# Patient Record
Sex: FEMALE | Race: WHITE | HISPANIC OR LATINO | Employment: UNEMPLOYED | URBAN - METROPOLITAN AREA
[De-identification: names, ages, dates, MRNs, and addresses within clinical notes are randomized per-mention and may not be internally consistent; named-entity substitution may affect disease eponyms.]

---

## 2017-01-30 ENCOUNTER — OUTSIDE FACILITY SERVICE (OUTPATIENT)
Dept: SURGERY | Facility: CLINIC | Age: 52
End: 2017-01-30

## 2017-01-30 PROCEDURE — 45378 DIAGNOSTIC COLONOSCOPY: CPT | Performed by: SURGERY

## 2017-02-06 ENCOUNTER — LAB (OUTPATIENT)
Dept: LAB | Facility: HOSPITAL | Age: 52
End: 2017-02-06

## 2017-02-06 ENCOUNTER — OFFICE VISIT (OUTPATIENT)
Dept: ONCOLOGY | Facility: CLINIC | Age: 52
End: 2017-02-06

## 2017-02-06 VITALS
HEIGHT: 61 IN | DIASTOLIC BLOOD PRESSURE: 82 MMHG | HEART RATE: 93 BPM | WEIGHT: 148.3 LBS | SYSTOLIC BLOOD PRESSURE: 120 MMHG | BODY MASS INDEX: 28 KG/M2 | RESPIRATION RATE: 16 BRPM | TEMPERATURE: 98.1 F | OXYGEN SATURATION: 97 %

## 2017-02-06 DIAGNOSIS — D50.9 MICROCYTIC ANEMIA: ICD-10-CM

## 2017-02-06 DIAGNOSIS — D50.0 IRON DEFICIENCY ANEMIA DUE TO CHRONIC BLOOD LOSS: Primary | ICD-10-CM

## 2017-02-06 DIAGNOSIS — Z86.000 HISTORY OF DUCTAL CARCINOMA IN SITU (DCIS) OF BREAST: ICD-10-CM

## 2017-02-06 LAB
ALBUMIN SERPL-MCNC: 4.3 G/DL (ref 3.5–5.2)
ALBUMIN/GLOB SERPL: 1.7 G/DL (ref 1.1–2.4)
ALP SERPL-CCNC: 76 U/L (ref 38–116)
ALT SERPL W P-5'-P-CCNC: 7 U/L (ref 0–33)
ANION GAP SERPL CALCULATED.3IONS-SCNC: 10.9 MMOL/L
AST SERPL-CCNC: 11 U/L (ref 0–32)
BASOPHILS # BLD AUTO: 0.03 10*3/MM3 (ref 0–0.1)
BASOPHILS NFR BLD AUTO: 0.4 % (ref 0–1.1)
BILIRUB SERPL-MCNC: <0.2 MG/DL (ref 0.1–1.2)
BUN BLD-MCNC: 10 MG/DL (ref 6–20)
BUN/CREAT SERPL: 14.9 (ref 7.3–30)
CALCIUM SPEC-SCNC: 9.2 MG/DL (ref 8.5–10.2)
CHLORIDE SERPL-SCNC: 103 MMOL/L (ref 98–107)
CO2 SERPL-SCNC: 26.1 MMOL/L (ref 22–29)
CREAT BLD-MCNC: 0.67 MG/DL (ref 0.6–1.1)
DEPRECATED RDW RBC AUTO: 47.7 FL (ref 37–49)
EOSINOPHIL # BLD AUTO: 0.04 10*3/MM3 (ref 0–0.36)
EOSINOPHIL NFR BLD AUTO: 0.6 % (ref 1–5)
ERYTHROCYTE [DISTWIDTH] IN BLOOD BY AUTOMATED COUNT: 15.8 % (ref 11.7–14.5)
FERRITIN SERPL-MCNC: 10.4 NG/ML (ref 11–207)
GFR SERPL CREATININE-BSD FRML MDRD: 93 ML/MIN/1.73
GLOBULIN UR ELPH-MCNC: 2.6 GM/DL (ref 1.8–3.5)
GLUCOSE BLD-MCNC: 103 MG/DL (ref 74–124)
HCT VFR BLD AUTO: 41.4 % (ref 34–45)
HGB BLD-MCNC: 13.2 G/DL (ref 11.5–14.9)
HGB RETIC QN: 30.6 PG (ref 29.8–36.1)
IMM GRANULOCYTES # BLD: 0.02 10*3/MM3 (ref 0–0.03)
IMM GRANULOCYTES NFR BLD: 0.3 % (ref 0–0.5)
IMM RETICS NFR: 4.1 % (ref 3–15.8)
IRON 24H UR-MRATE: 41 MCG/DL (ref 37–145)
IRON SATN MFR SERPL: 10 % (ref 14–48)
LYMPHOCYTES # BLD AUTO: 2.05 10*3/MM3 (ref 1–3.5)
LYMPHOCYTES NFR BLD AUTO: 30.4 % (ref 20–49)
MCH RBC QN AUTO: 26.3 PG (ref 27–33)
MCHC RBC AUTO-ENTMCNC: 31.9 G/DL (ref 32–35)
MCV RBC AUTO: 82.6 FL (ref 83–97)
MONOCYTES # BLD AUTO: 0.41 10*3/MM3 (ref 0.25–0.8)
MONOCYTES NFR BLD AUTO: 6.1 % (ref 4–12)
NEUTROPHILS # BLD AUTO: 4.2 10*3/MM3 (ref 1.5–7)
NEUTROPHILS NFR BLD AUTO: 62.2 % (ref 39–75)
NRBC BLD MANUAL-RTO: 0 /100 WBC (ref 0–0)
PLATELET # BLD AUTO: 305 10*3/MM3 (ref 150–375)
PMV BLD AUTO: 9.2 FL (ref 8.9–12.1)
POTASSIUM BLD-SCNC: 4.6 MMOL/L (ref 3.5–4.7)
PROT SERPL-MCNC: 6.9 G/DL (ref 6.3–8)
RBC # BLD AUTO: 5.01 10*6/MM3 (ref 3.9–5)
RETICS/RBC NFR AUTO: 0.85 % (ref 0.6–2)
SODIUM BLD-SCNC: 140 MMOL/L (ref 134–145)
TIBC SERPL-MCNC: 396 MCG/DL (ref 249–505)
TRANSFERRIN SERPL-MCNC: 283 MG/DL (ref 200–360)
WBC NRBC COR # BLD: 6.75 10*3/MM3 (ref 4–10)

## 2017-02-06 PROCEDURE — 83540 ASSAY OF IRON: CPT

## 2017-02-06 PROCEDURE — 85025 COMPLETE CBC W/AUTO DIFF WBC: CPT

## 2017-02-06 PROCEDURE — 80053 COMPREHEN METABOLIC PANEL: CPT

## 2017-02-06 PROCEDURE — 85046 RETICYTE/HGB CONCENTRATE: CPT

## 2017-02-06 PROCEDURE — 82728 ASSAY OF FERRITIN: CPT

## 2017-02-06 PROCEDURE — 99213 OFFICE O/P EST LOW 20 MIN: CPT | Performed by: INTERNAL MEDICINE

## 2017-02-06 PROCEDURE — 36415 COLL VENOUS BLD VENIPUNCTURE: CPT

## 2017-02-06 PROCEDURE — 84466 ASSAY OF TRANSFERRIN: CPT

## 2017-02-06 RX ORDER — FERROUS SULFATE 325(65) MG
325 TABLET ORAL
Qty: 60 TABLET | Refills: 11
Start: 2017-02-06 | End: 2018-02-06

## 2017-02-06 NOTE — PROGRESS NOTES
[facility] OUTPATIENT CLINIC FOLLOW UP VISIT    REASON FOR FOLLOW-UP:    1.  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  2.  Right reduction mammoplasty for symmetry and revision of left transverse rectus abdominis myocutaneous flap with vertical lateral coning revision and revision of the medial aspect of the mastectomy scar by Dr. Narvaez on 12/29/2015.  3.  Abnormal mammogram on 8/30/2016 with a 2.4cm mass in the right breast at the 9:00 position 7cm from the nipple.  Biopsy on 9/12/2016 showed fibroadenoma.      4.  Iron deficiency anemia      HISTORY OF PRESENT ILLNESS:  eRnée Johns is a 51 y.o. female who returns today for follow up of the above issues.  I saw her initially on 11/9/2015 for hormone receptor negative DCIS of the left breast.  I recommended no medical therapy at that time with respect to the left breast.  We discussed that when she becomes postmenopausal we could consider vacation for prevention of breast abnormalities in the right breast.    On 12/29/2015 she had a right reduction mammoplasty and revision of the transverse rectus abdominis flap.  She also had abnormal breast imaging of the right breast and had a biopsy on 9/12/2016 showing fibroadenoma.    At her last visit on 11/14/2016 she was found to be iron deficient.  I recommended ferrous sulfate 325 mg twice daily.  She states that she generally tolerates this well although sometimes takes it only once daily due to stomach upset.    PAST MEDICAL, SURGICAL, FAMILY, AND SOCIAL HISTORIES WERE REVIEWED WITH THE PATIENT AND IN THE ELECTRONIC MEDICAL RECORD, AND WERE UPDATED IF INDICATED.    ALLERGIES:  No Known Allergies    MEDICATIONS:  The medication list has been reviewed with the patient by the medical assistant, and the list has been updated in the electronic medical record, which I  "reviewed.  Medication dosages and frequencies were confirmed to be accurate.    REVIEW OF SYSTEMS:  PAIN:  See Vital Signs below.  GENERAL:  No fevers, chills, night sweats, or unintended weight loss.  SKIN:  No rashes or non-healing lesions.  HEME/LYMPH:  No abnormal bleeding.  No palpable lymphadenopathy.  EYES:  No vision changes or diplopia.  ENT:  No sore throat or difficulty swallowing.  RESPIRATORY:  No cough, shortness of breath, hemoptysis, or wheezing.  CARDIOVASCULAR:  No chest pain, palpitations, orthopnea, or dyspnea on exertion.  GASTROINTESTINAL:  No abdominal pain, nausea, vomiting, constipation, diarrhea, melena, or hematochezia.  GENITOURINARY:  No dysuria or hematuria.  MUSCULOSKELETAL:  No joint pain, swelling, or erythema.  NEUROLOGIC:  No dizziness, loss of consciousness, or seizures.  PSYCHIATRIC:  No depression, anxiety, or mood changes.    Vitals:    02/06/17 0912   BP: 120/82   Pulse: 93   Resp: 16   Temp: 98.1 °F (36.7 °C)   TempSrc: Oral   SpO2: 97%   Weight: 148 lb 4.8 oz (67.3 kg)   Height: 60.63\" (154 cm)   PainSc: 0-No pain       PHYSICAL EXAMINATION:  GENERAL:  Well-developed well-nourished female; awake, alert and oriented, in no acute distress.  SKIN:  Warm and dry, without rashes, purpura, or petechiae.  HEAD:  Normocephalic, atraumatic.  EYES:  Pupils equal, round and reactive to light.  Extraocular movements intact.  Conjunctivae normal.  EARS:  Hearing intact.  NOSE:  Septum midline.  No excoriations or nasal discharge.  MOUTH:  No stomatitis or ulcers.  Lips are normal.  THROAT:  Oropharynx without lesions or exudates.  NECK:  Supple with good range of motion; no thyromegaly or masses; no JVD or bruits.  LYMPHATICS:  No cervical, supraclavicular, axillary, or inguinal lymphadenopathy.  CHEST:  Lungs are clear to auscultation bilaterally.  No wheezes, rales, or rhonchi.  BREASTS: Not examined today  HEART:  Regular rate; normal rhythm.  No murmurs, gallops or rubs.  ABDOMEN:  " Soft, non-tender, non-distended.  Normal active bowel sounds.  No organomegaly.  TRAM flap incision well-healed.  EXTREMITIES:  No clubbing, cyanosis, or edema.  NEUROLOGICAL:  No focal neurologic deficits.    DIAGNOSTIC DATA:  Lab Results   Component Value Date    WBC 6.75 02/06/2017    HGB 13.2 02/06/2017    HCT 41.4 02/06/2017    MCV 82.6 (L) 02/06/2017     02/06/2017    ferritin 10.4  10% iron saturation    IMAGING:  Mammogram 8/30/2016  IMPRESSION:  1. Irregular 2.4 cm mass in the right breast at the 9:00 position on the  order of 7 cm from the nipple. Correlation with an ultrasound guided  right breast biopsy is recommended.       2. Benign cyst in the right breast as described. If desired, the  dominant cyst measuring on the order of 2.5 cm in greatest dimension at  the 10:00 position on the order of 3 cm from the nipple can be aspirated  for symptomatic relief to resolve its presence.      3. There are no findings suspicious for malignancy in the left TRAM  flap.      ASSESSMENT:  This is a 51 y.o. female with:  1.  History of hormone receptor negative DCIS of the left breast status post mastectomy with TRAM flap.  No medical therapy has been recommended.  We may consider chemoprevention when she becomes postmenopausal.  2.  Recent abnormal mammogram of the right breast with biopsy showing fibroadenoma  3.  Iron deficiency anemia most likely secondary to menorrhagia.  She continues ferrous sulfate twice daily with a significant improvement in her hemoglobin.  Iron levels remain a little bit low.    PLAN:   1.  Continue ferrous sulfate 325 mg twice daily.  2.  Return in 4 months for follow-up with a CBC and stat iron labs.

## 2017-05-26 ENCOUNTER — OFFICE VISIT (OUTPATIENT)
Dept: ONCOLOGY | Facility: CLINIC | Age: 52
End: 2017-05-26

## 2017-05-26 ENCOUNTER — LAB (OUTPATIENT)
Dept: LAB | Facility: HOSPITAL | Age: 52
End: 2017-05-26

## 2017-05-26 VITALS
HEART RATE: 70 BPM | RESPIRATION RATE: 16 BRPM | WEIGHT: 144.2 LBS | HEIGHT: 61 IN | OXYGEN SATURATION: 97 % | DIASTOLIC BLOOD PRESSURE: 64 MMHG | BODY MASS INDEX: 27.23 KG/M2 | TEMPERATURE: 98.4 F | SYSTOLIC BLOOD PRESSURE: 118 MMHG

## 2017-05-26 DIAGNOSIS — D50.0 IRON DEFICIENCY ANEMIA DUE TO CHRONIC BLOOD LOSS: Primary | ICD-10-CM

## 2017-05-26 DIAGNOSIS — Z86.000 HISTORY OF DUCTAL CARCINOMA IN SITU (DCIS) OF BREAST: ICD-10-CM

## 2017-05-26 DIAGNOSIS — D50.0 IRON DEFICIENCY ANEMIA DUE TO CHRONIC BLOOD LOSS: ICD-10-CM

## 2017-05-26 LAB
BASOPHILS # BLD AUTO: 0.03 10*3/MM3 (ref 0–0.1)
BASOPHILS NFR BLD AUTO: 0.5 % (ref 0–1.1)
DEPRECATED RDW RBC AUTO: 39.7 FL (ref 37–49)
EOSINOPHIL # BLD AUTO: 0.04 10*3/MM3 (ref 0–0.36)
EOSINOPHIL NFR BLD AUTO: 0.7 % (ref 1–5)
ERYTHROCYTE [DISTWIDTH] IN BLOOD BY AUTOMATED COUNT: 12.7 % (ref 11.7–14.5)
FERRITIN SERPL-MCNC: 19.3 NG/ML (ref 11–207)
HCT VFR BLD AUTO: 42.8 % (ref 34–45)
HGB BLD-MCNC: 13.7 G/DL (ref 11.5–14.9)
HGB RETIC QN: 31.5 PG (ref 29.8–36.1)
HOLD SPECIMEN: NORMAL
IMM GRANULOCYTES # BLD: 0.01 10*3/MM3 (ref 0–0.03)
IMM GRANULOCYTES NFR BLD: 0.2 % (ref 0–0.5)
IMM RETICS NFR: 2 % (ref 3–15.8)
IRON 24H UR-MRATE: 109 MCG/DL (ref 37–145)
IRON SATN MFR SERPL: 26 % (ref 14–48)
LYMPHOCYTES # BLD AUTO: 1.37 10*3/MM3 (ref 1–3.5)
LYMPHOCYTES NFR BLD AUTO: 22.9 % (ref 20–49)
MCH RBC QN AUTO: 27.5 PG (ref 27–33)
MCHC RBC AUTO-ENTMCNC: 32 G/DL (ref 32–35)
MCV RBC AUTO: 85.9 FL (ref 83–97)
MONOCYTES # BLD AUTO: 0.39 10*3/MM3 (ref 0.25–0.8)
MONOCYTES NFR BLD AUTO: 6.5 % (ref 4–12)
NEUTROPHILS # BLD AUTO: 4.15 10*3/MM3 (ref 1.5–7)
NEUTROPHILS NFR BLD AUTO: 69.2 % (ref 39–75)
NRBC BLD MANUAL-RTO: 0 /100 WBC (ref 0–0)
PLATELET # BLD AUTO: 290 10*3/MM3 (ref 150–375)
PMV BLD AUTO: 9.7 FL (ref 8.9–12.1)
RBC # BLD AUTO: 4.98 10*6/MM3 (ref 3.9–5)
RETICS/RBC NFR AUTO: 0.76 % (ref 0.6–2)
TIBC SERPL-MCNC: 421 MCG/DL (ref 249–505)
TRANSFERRIN SERPL-MCNC: 301 MG/DL (ref 200–360)
WBC NRBC COR # BLD: 5.99 10*3/MM3 (ref 4–10)

## 2017-05-26 PROCEDURE — 84466 ASSAY OF TRANSFERRIN: CPT

## 2017-05-26 PROCEDURE — 36415 COLL VENOUS BLD VENIPUNCTURE: CPT

## 2017-05-26 PROCEDURE — 85046 RETICYTE/HGB CONCENTRATE: CPT

## 2017-05-26 PROCEDURE — 85025 COMPLETE CBC W/AUTO DIFF WBC: CPT

## 2017-05-26 PROCEDURE — 99213 OFFICE O/P EST LOW 20 MIN: CPT | Performed by: INTERNAL MEDICINE

## 2017-05-26 PROCEDURE — 83540 ASSAY OF IRON: CPT

## 2017-05-26 PROCEDURE — 82728 ASSAY OF FERRITIN: CPT

## 2017-08-31 ENCOUNTER — HOSPITAL ENCOUNTER (OUTPATIENT)
Dept: MAMMOGRAPHY | Facility: HOSPITAL | Age: 52
Discharge: HOME | End: 2017-08-31
Attending: INTERNAL MEDICINE | Admitting: INTERNAL MEDICINE

## 2017-08-31 DIAGNOSIS — Z86.000 HISTORY OF DUCTAL CARCINOMA IN SITU (DCIS) OF BREAST: ICD-10-CM

## 2017-08-31 PROCEDURE — G0202 SCR MAMMO BI INCL CAD: HCPCS

## 2017-10-19 ENCOUNTER — OFFICE VISIT (OUTPATIENT)
Dept: SURGERY | Facility: CLINIC | Age: 52
End: 2017-10-19

## 2017-10-19 VITALS
SYSTOLIC BLOOD PRESSURE: 120 MMHG | OXYGEN SATURATION: 99 % | WEIGHT: 145.4 LBS | DIASTOLIC BLOOD PRESSURE: 80 MMHG | HEIGHT: 60 IN | BODY MASS INDEX: 28.54 KG/M2 | HEART RATE: 82 BPM

## 2017-10-19 DIAGNOSIS — Z85.3 HISTORY OF BREAST CANCER IN FEMALE: Primary | ICD-10-CM

## 2017-10-19 PROCEDURE — 99213 OFFICE O/P EST LOW 20 MIN: CPT | Performed by: SURGERY

## 2017-10-19 NOTE — PROGRESS NOTES
"Chief complaint: Personal history of left breast cancer yearly follow-up     Patient is a 51 y.o. female established history of left breast cancer with mastectomy and TRAM flap.  Patient denies any new symptoms.  Patient reports she has not felt any new masses in the right breast or nodules in the skin flaps of the left TRAM flap.    Past Medical History:   Diagnosis Date   • Breast cancer     Left breast - 2015   • Cancer    • Endometrial hyperplasia without atypia, simple     tx w/ provera, recheck EMB 6 mo wnl     Past Surgical History:   Procedure Laterality Date   • BREAST SURGERY      Done by     •  SECTION     • DILATATION AND CURETTAGE     • MASTECTOMY Left 10/13/2015    Mastectomy with TRAM flap   • TUBAL ABDOMINAL LIGATION       Family History   Problem Relation Age of Onset   • Family history unknown: Yes   • Diabetes Father      Social History   Substance Use Topics   • Smoking status: Never Smoker   • Smokeless tobacco: Never Used   • Alcohol use No         Current Outpatient Prescriptions:   •  ferrous sulfate 325 (65 FE) MG tablet, Take 1 tablet by mouth 2 (Two) Times a Day Before Meals., Disp: 60 tablet, Rfl: 11    Review of Systems  All other systems have been reviewed and are negative.  Vitals:    10/19/17 0846   BP: 120/80   Pulse: 82   SpO2: 99%   Weight: 145 lb 6.4 oz (66 kg)   Height: 60\" (152.4 cm)       Physical Exam  General/physcological:   Alert and oriented x3 in no acute distress  HEENT: Normal cephalic, atraumatic, PERRLA, EOMI, sclera anicteric, moist mucous membranes, neck is supple, no JVD, no carotid bruits, no thyromegaly no adenopathy  Respiratory: CTA and percussion  CVA: RRR, normal S1-S2, no murmurs, no gallops or rubs  Breast: Breast examination is performed in sitting position as well as supine position.  In the sitting position the right breast has no nipple retraction or skin changes.  Upon palpation there are no discrete palpable masses in the " right breast or the left TRAM flap.  There is no palpable bilateral axillary or supraclavicular adenopathy.  Breast examination is repeated in the supine position and there are no new findings.  GI: Positive BS, soft, nondistended, nontender, no rebound, no guarding, no hernias, no organomegaly and no masses  Musculoskeletal: Full range of motion, no clubbing, no cyanosis or edema  Neurovascular: Grossly intact    Patient does not use tobacco products currently and I have counseled the patient to not start using tobacco products in the future.  Mammogram has been reviewed and is benign  Assessment:  History of left breast cancer with left mastectomy and TRAM flap  Plan:  I have discussed the above findings and x-ray findings with the patient.  I will see her back in one year for her yearly exam with a repeat mammogram.  I have instructed the patient to continue self breast examinations monthly and follow-up if she finds any new findings.    Natividad Zapata MD  General, Minimally Invasive and Endoscopic Surgery  [facility]    [address], [address]  [place], [place], [facility]  P: [phone]  F: [phone]    Cc:  Natividad Zapata MD

## 2017-10-19 NOTE — PROGRESS NOTES
"Chief complaint:  Post-op  Follow up    History of Present Illness    This is Renée Johns 51 y.o. status post *** and is doing very well.  Patient denies fever, chills, nausea or vomiting.  Patient's pain is well-controlled.      The following portions of the patient's history were reviewed and updated as appropriate: allergies, current medications, past family history, past medical history, past social history, past surgical history and problem list.    Vitals:    10/19/17 0846   BP: 120/80   Pulse: 82   SpO2: 99%   Weight: 145 lb 6.4 oz (66 kg)   Height: 60\" (152.4 cm)       Physical Exam  Incision is well-healed without evidence of {No wound infection:57504}.    Patient does not use tobacco products currently and I have counseled the patient not to start using tobacco products in the future.    Assessment/plan:    This is Renée Johns 51 y.o. status post *** and is doing very well.  I have instructed the patient not lift greater than 10 pounds for total of 6 weeks from the time of surgery. I have instructed the patient follow-up as needed.    Natividad Zapata MD  General, Minimally Invasive and Endoscopic Surgery  [facility]    [address], [address]  [place], [place], [address]  P: [phone]  F: [phone]    Cc:  Natividad Zapata MD  "

## 2017-11-03 ENCOUNTER — OFFICE VISIT (OUTPATIENT)
Dept: ONCOLOGY | Facility: CLINIC | Age: 52
End: 2017-11-03

## 2017-11-03 ENCOUNTER — LAB (OUTPATIENT)
Dept: LAB | Facility: HOSPITAL | Age: 52
End: 2017-11-03

## 2017-11-03 VITALS
WEIGHT: 147.2 LBS | OXYGEN SATURATION: 99 % | SYSTOLIC BLOOD PRESSURE: 114 MMHG | BODY MASS INDEX: 27.79 KG/M2 | DIASTOLIC BLOOD PRESSURE: 68 MMHG | HEIGHT: 61 IN | RESPIRATION RATE: 16 BRPM | HEART RATE: 87 BPM | TEMPERATURE: 98.3 F

## 2017-11-03 DIAGNOSIS — D50.0 IRON DEFICIENCY ANEMIA DUE TO CHRONIC BLOOD LOSS: ICD-10-CM

## 2017-11-03 DIAGNOSIS — Z86.000 HISTORY OF DUCTAL CARCINOMA IN SITU (DCIS) OF BREAST: Primary | ICD-10-CM

## 2017-11-03 LAB
BASOPHILS # BLD AUTO: 0.03 10*3/MM3 (ref 0–0.1)
BASOPHILS NFR BLD AUTO: 0.4 % (ref 0–1.1)
DEPRECATED RDW RBC AUTO: 39.5 FL (ref 37–49)
EOSINOPHIL # BLD AUTO: 0.03 10*3/MM3 (ref 0–0.36)
EOSINOPHIL NFR BLD AUTO: 0.4 % (ref 1–5)
ERYTHROCYTE [DISTWIDTH] IN BLOOD BY AUTOMATED COUNT: 13.1 % (ref 11.7–14.5)
HCT VFR BLD AUTO: 39.9 % (ref 34–45)
HGB BLD-MCNC: 13 G/DL (ref 11.5–14.9)
HGB RETIC QN: 31 PG (ref 29.8–36.1)
IMM GRANULOCYTES # BLD: 0.02 10*3/MM3 (ref 0–0.03)
IMM GRANULOCYTES NFR BLD: 0.3 % (ref 0–0.5)
IMM RETICS NFR: 6.7 % (ref 3–15.8)
LYMPHOCYTES # BLD AUTO: 1.58 10*3/MM3 (ref 1–3.5)
LYMPHOCYTES NFR BLD AUTO: 23.2 % (ref 20–49)
MCH RBC QN AUTO: 26.9 PG (ref 27–33)
MCHC RBC AUTO-ENTMCNC: 32.6 G/DL (ref 32–35)
MCV RBC AUTO: 82.4 FL (ref 83–97)
MONOCYTES # BLD AUTO: 0.43 10*3/MM3 (ref 0.25–0.8)
MONOCYTES NFR BLD AUTO: 6.3 % (ref 4–12)
NEUTROPHILS # BLD AUTO: 4.73 10*3/MM3 (ref 1.5–7)
NEUTROPHILS NFR BLD AUTO: 69.4 % (ref 39–75)
NRBC BLD MANUAL-RTO: 0 /100 WBC (ref 0–0)
PLATELET # BLD AUTO: 275 10*3/MM3 (ref 150–375)
PMV BLD AUTO: 9.9 FL (ref 8.9–12.1)
RBC # BLD AUTO: 4.84 10*6/MM3 (ref 3.9–5)
RETICS/RBC NFR AUTO: 1 % (ref 0.6–2)
WBC NRBC COR # BLD: 6.82 10*3/MM3 (ref 4–10)

## 2017-11-03 PROCEDURE — 85025 COMPLETE CBC W/AUTO DIFF WBC: CPT | Performed by: INTERNAL MEDICINE

## 2017-11-03 PROCEDURE — 85046 RETICYTE/HGB CONCENTRATE: CPT | Performed by: INTERNAL MEDICINE

## 2017-11-03 PROCEDURE — 99213 OFFICE O/P EST LOW 20 MIN: CPT | Performed by: INTERNAL MEDICINE

## 2017-11-03 PROCEDURE — 36416 COLLJ CAPILLARY BLOOD SPEC: CPT | Performed by: INTERNAL MEDICINE

## 2017-11-03 NOTE — PROGRESS NOTES
[facility] OUTPATIENT CLINIC FOLLOW UP VISIT    REASON FOR FOLLOW-UP:    1.  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  2.  Right reduction mammoplasty for symmetry and revision of left transverse rectus abdominis myocutaneous flap with vertical lateral coning revision and revision of the medial aspect of the mastectomy scar by Dr. Narvaez on 12/29/2015.  3.  Abnormal mammogram on 8/30/2016 with a 2.4cm mass in the right breast at the 9:00 position 7cm from the nipple.  Biopsy on 9/12/2016 showed fibroadenoma.      4.  Iron deficiency anemia      HISTORY OF PRESENT ILLNESS:  Renée Johns is a 51 y.o. female who returns today for follow up of the above issues.  I saw her initially on 11/9/2015 for hormone receptor negative DCIS of the left breast.  I recommended no medical therapy at that time with respect to the left breast.  We discussed that when she becomes postmenopausal we could consider medication for prevention of breast abnormalities in the right breast.    On 12/29/2015 she had a right reduction mammoplasty and revision of the transverse rectus abdominis flap.  She also had abnormal breast imaging of the right breast and had a biopsy on 9/12/2016 showing fibroadenoma.    On 11/14/2016 she was found to be iron deficient.  I recommended ferrous sulfate 325 mg twice daily.  At this point she has continued ferrous sulfate 1 tablet every other day.  She tolerates this very well.    No new problems with her breasts.    PAST MEDICAL, SURGICAL, FAMILY, AND SOCIAL HISTORIES WERE REVIEWED WITH THE PATIENT AND IN THE ELECTRONIC MEDICAL RECORD, AND WERE UPDATED IF INDICATED.    ALLERGIES:  No Known Allergies    MEDICATIONS:  The medication list has been reviewed with the patient by the medical assistant, and the list has been updated in the electronic medical  "record, which I reviewed.  Medication dosages and frequencies were confirmed to be accurate.    REVIEW OF SYSTEMS:  PAIN:  See Vital Signs below.  GENERAL:  No fevers, chills, night sweats, or unintended weight loss.  SKIN:  No rashes or non-healing lesions.  HEME/LYMPH:  No abnormal bleeding.  No palpable lymphadenopathy.  EYES:  No vision changes or diplopia.  ENT:  No sore throat or difficulty swallowing.  RESPIRATORY:  No cough, shortness of breath, hemoptysis, or wheezing.  CARDIOVASCULAR:  No chest pain, palpitations, orthopnea, or dyspnea on exertion.  GASTROINTESTINAL:  No abdominal pain, nausea, vomiting, constipation, diarrhea, melena, or hematochezia.  See history of present illness  GENITOURINARY:  No dysuria or hematuria.  MUSCULOSKELETAL:  No joint pain, swelling, or erythema.  NEUROLOGIC:  No dizziness, loss of consciousness, or seizures.  PSYCHIATRIC:  No depression, anxiety, or mood changes.    Vitals:    11/03/17 1111   BP: 114/68   Pulse: 87   Resp: 16   Temp: 98.3 °F (36.8 °C)   TempSrc: Oral   SpO2: 99%   Weight: 147 lb 3.2 oz (66.8 kg)   Height: 60.55\" (153.8 cm)  Comment: new ht without shoes   PainSc: 0-No pain       PHYSICAL EXAMINATION:  GENERAL:  Well-developed well-nourished female; awake, alert and oriented, in no acute distress.  SKIN:  Warm and dry, without rashes, purpura, or petechiae.  HEAD:  Normocephalic, atraumatic.  EYES:  Pupils equal, round and reactive to light.  Extraocular movements intact.  Conjunctivae normal.  EARS:  Hearing intact.  NOSE:  Septum midline.  No excoriations or nasal discharge.  MOUTH:  No stomatitis or ulcers.  Lips are normal.  THROAT:  Oropharynx without lesions or exudates.  NECK:  Supple with good range of motion; no thyromegaly or masses; no JVD or bruits.  LYMPHATICS:  No cervical, supraclavicular, axillary, or inguinal lymphadenopathy.  CHEST:  Lungs are clear to auscultation bilaterally.  No wheezes, rales, or rhonchi.  BREASTS:Both breasts were " examined today.  Left TRAM flap without abnormalities.  Prior right breast reduction.  Some fullness at the 9 to 10 o'clock position adjacent to the nipple in the right breast but no discrete nodule.  HEART:  Regular rate; normal rhythm.  No murmurs, gallops or rubs.  ABDOMEN:  Soft, non-tender, non-distended.  Normal active bowel sounds.  No organomegaly.  TRAM flap incision well-healed.  EXTREMITIES:  No clubbing, cyanosis, or edema.  NEUROLOGICAL:  No focal neurologic deficits.    DIAGNOSTIC DATA:  Lab Results   Component Value Date    WBC 6.82 11/03/2017    HGB 13.0 11/03/2017    HCT 39.9 11/03/2017    MCV 82.4 (L) 11/03/2017     11/03/2017       IMAGING:  Mammogram 8/31/2017    FINDINGS: Right breast scattered fibroglandular densities. There is now  a surgical marker in position. There is a stable circumscribed rounded  opacity within the superior aspect of the breast again demonstrated 2.9  cm in diameter. This corresponds to the cyst demonstrated on 08/30/2016  ultrasound located at the 10 o'clock position. No suspicious mass. No  new mass. No axillary lymphadenopathy nor suspicious calcification  identified.      The left TRAM flap is stable and satisfactory in appearance.  False-negative rate at least 10%.      CONCLUSION: Benign BI-RADS Category 2, recommend monthly  self-examination and annual mammography.  ASSESSMENT:  This is a 51 y.o. female with:  1.  History of hormone receptor negative DCIS of the left breast status post mastectomy with TRAM flap.  No medical therapy has been recommended.  We may consider chemoprevention when she becomes postmenopausal.  2.  Abnormal mammogram of the right breast with biopsy showing fibroadenoma  3.  Iron deficiency anemia most likely secondary to menorrhagia.  She continues ferrous sulfate 1 tablet every other day.  I recommended that she continue on this schedule at this point.    PLAN:   1.  Continue ferrous sulfate 325 mg every other day or even 3 times  weekly.  2.  I will see her back in 6 months for follow-up with labs dating a CBC, reticulocyte count, ferritin, and iron panel.

## 2018-04-20 ENCOUNTER — OFFICE VISIT (OUTPATIENT)
Dept: ONCOLOGY | Facility: CLINIC | Age: 53
End: 2018-04-20

## 2018-04-20 ENCOUNTER — LAB (OUTPATIENT)
Dept: LAB | Facility: HOSPITAL | Age: 53
End: 2018-04-20

## 2018-04-20 VITALS
DIASTOLIC BLOOD PRESSURE: 82 MMHG | SYSTOLIC BLOOD PRESSURE: 118 MMHG | RESPIRATION RATE: 12 BRPM | BODY MASS INDEX: 28.43 KG/M2 | WEIGHT: 150.6 LBS | HEART RATE: 80 BPM | OXYGEN SATURATION: 99 % | HEIGHT: 61 IN | TEMPERATURE: 99.3 F

## 2018-04-20 DIAGNOSIS — D50.0 IRON DEFICIENCY ANEMIA DUE TO CHRONIC BLOOD LOSS: ICD-10-CM

## 2018-04-20 DIAGNOSIS — Z86.000 HISTORY OF DUCTAL CARCINOMA IN SITU (DCIS) OF BREAST: Primary | ICD-10-CM

## 2018-04-20 LAB
BASOPHILS # BLD AUTO: 0.03 10*3/MM3 (ref 0–0.1)
BASOPHILS NFR BLD AUTO: 0.4 % (ref 0–1.1)
DEPRECATED RDW RBC AUTO: 44 FL (ref 37–49)
EOSINOPHIL # BLD AUTO: 0.04 10*3/MM3 (ref 0–0.36)
EOSINOPHIL NFR BLD AUTO: 0.5 % (ref 1–5)
ERYTHROCYTE [DISTWIDTH] IN BLOOD BY AUTOMATED COUNT: 14.5 % (ref 11.7–14.5)
FERRITIN SERPL-MCNC: 7.2 NG/ML (ref 11–207)
HCT VFR BLD AUTO: 40.7 % (ref 34–45)
HGB BLD-MCNC: 12.5 G/DL (ref 11.5–14.9)
HGB RETIC QN: 30.8 PG (ref 29.8–36.1)
IMM GRANULOCYTES # BLD: 0.02 10*3/MM3 (ref 0–0.03)
IMM GRANULOCYTES NFR BLD: 0.3 % (ref 0–0.5)
IMM RETICS NFR: 7.6 % (ref 3–15.8)
IRON 24H UR-MRATE: 63 MCG/DL (ref 37–145)
IRON SATN MFR SERPL: 15 % (ref 14–48)
LYMPHOCYTES # BLD AUTO: 1.46 10*3/MM3 (ref 1–3.5)
LYMPHOCYTES NFR BLD AUTO: 19.4 % (ref 20–49)
MCH RBC QN AUTO: 25.8 PG (ref 27–33)
MCHC RBC AUTO-ENTMCNC: 30.7 G/DL (ref 32–35)
MCV RBC AUTO: 84.1 FL (ref 83–97)
MONOCYTES # BLD AUTO: 0.54 10*3/MM3 (ref 0.25–0.8)
MONOCYTES NFR BLD AUTO: 7.2 % (ref 4–12)
NEUTROPHILS # BLD AUTO: 5.42 10*3/MM3 (ref 1.5–7)
NEUTROPHILS NFR BLD AUTO: 72.2 % (ref 39–75)
NRBC BLD MANUAL-RTO: 0 /100 WBC (ref 0–0)
PLATELET # BLD AUTO: 293 10*3/MM3 (ref 150–375)
PMV BLD AUTO: 9.7 FL (ref 8.9–12.1)
RBC # BLD AUTO: 4.84 10*6/MM3 (ref 3.9–5)
RETICS/RBC NFR AUTO: 1.06 % (ref 0.6–2)
TIBC SERPL-MCNC: 409 MCG/DL (ref 249–505)
TRANSFERRIN SERPL-MCNC: 292 MG/DL (ref 200–360)
WBC NRBC COR # BLD: 7.51 10*3/MM3 (ref 4–10)

## 2018-04-20 PROCEDURE — 36415 COLL VENOUS BLD VENIPUNCTURE: CPT | Performed by: INTERNAL MEDICINE

## 2018-04-20 PROCEDURE — 85025 COMPLETE CBC W/AUTO DIFF WBC: CPT | Performed by: INTERNAL MEDICINE

## 2018-04-20 PROCEDURE — 82728 ASSAY OF FERRITIN: CPT | Performed by: INTERNAL MEDICINE

## 2018-04-20 PROCEDURE — 84466 ASSAY OF TRANSFERRIN: CPT | Performed by: INTERNAL MEDICINE

## 2018-04-20 PROCEDURE — 85046 RETICYTE/HGB CONCENTRATE: CPT | Performed by: INTERNAL MEDICINE

## 2018-04-20 PROCEDURE — 83540 ASSAY OF IRON: CPT | Performed by: INTERNAL MEDICINE

## 2018-04-20 PROCEDURE — 99213 OFFICE O/P EST LOW 20 MIN: CPT | Performed by: INTERNAL MEDICINE

## 2018-04-20 NOTE — PROGRESS NOTES
[facility] OUTPATIENT CLINIC FOLLOW UP VISIT    REASON FOR FOLLOW-UP:    1.  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  2.  Right reduction mammoplasty for symmetry and revision of left transverse rectus abdominis myocutaneous flap with vertical lateral coning revision and revision of the medial aspect of the mastectomy scar by Dr. Narvaez on 12/29/2015.  3.  Abnormal mammogram on 8/30/2016 with a 2.4cm mass in the right breast at the 9:00 position 7cm from the nipple.  Biopsy on 9/12/2016 showed fibroadenoma.      4.  Iron deficiency anemia      HISTORY OF PRESENT ILLNESS:  Renée Johns is a 52 y.o. female who returns today for follow up of the above issues.  I saw her initially on 11/9/2015 for hormone receptor negative DCIS of the left breast.  I recommended no medical therapy at that time with respect to the left breast.  We discussed that when she becomes postmenopausal we could consider medication for prevention of breast abnormalities in the right breast.    On 12/29/2015 she had a right reduction mammoplasty and revision of the transverse rectus abdominis flap.  She also had abnormal breast imaging of the right breast and had a biopsy on 9/12/2016 showing fibroadenoma.    On 11/14/2016 she was found to be iron deficient.  I recommended ferrous sulfate 325 mg twice daily.     She is taking the ferrous sulfate sporadically at this point but otherwise tolerates it well.  She states that her menstrual blood loss has decreased over the past few months.  She denies any other bleeding.    No new problems with her breasts.  No new nodules or nipple discharge.    PAST MEDICAL, SURGICAL, FAMILY, AND SOCIAL HISTORIES WERE REVIEWED WITH THE PATIENT AND IN THE ELECTRONIC MEDICAL RECORD, AND WERE UPDATED IF INDICATED.    ALLERGIES:  No Known  "Allergies    MEDICATIONS:  The medication list has been reviewed with the patient by the medical assistant, and the list has been updated in the electronic medical record, which I reviewed.  Medication dosages and frequencies were confirmed to be accurate.    REVIEW OF SYSTEMS:  PAIN:  See Vital Signs below.  GENERAL:  No fevers, chills, night sweats, or unintended weight loss.  SKIN:  No rashes or non-healing lesions.  HEME/LYMPH:  No abnormal bleeding.  No palpable lymphadenopathy.  EYES:  No vision changes or diplopia.  ENT:  No sore throat or difficulty swallowing.  RESPIRATORY:  No cough, shortness of breath, hemoptysis, or wheezing.  CARDIOVASCULAR:  No chest pain, palpitations, orthopnea, or dyspnea on exertion.  GASTROINTESTINAL:  No abdominal pain, nausea, vomiting, constipation, diarrhea, melena, or hematochezia.   GENITOURINARY:  No dysuria or hematuria.  MUSCULOSKELETAL:  No joint pain, swelling, or erythema.  NEUROLOGIC:  No dizziness, loss of consciousness, or seizures.  PSYCHIATRIC:  No depression, anxiety, or mood changes.    Vitals:    04/20/18 0937   BP: 118/82   Pulse: 80   Resp: 12   Temp: 99.3 °F (37.4 °C)   TempSrc: Oral   SpO2: 99%   Weight: 68.3 kg (150 lb 9.6 oz)   Height: 154.4 cm (60.79\")  Comment: new height with out shoes on   PainSc: 0-No pain       PHYSICAL EXAMINATION:  GENERAL:  Well-developed well-nourished female; awake, alert and oriented, in no acute distress.  SKIN:  Warm and dry, without rashes, purpura, or petechiae.  HEAD:  Normocephalic, atraumatic.  EYES:  Pupils equal, round and reactive to light.  Extraocular movements intact.  Conjunctivae normal.  EARS:  Hearing intact.  NOSE:  Septum midline.  No excoriations or nasal discharge.  MOUTH:  No stomatitis or ulcers.  Lips are normal.  THROAT:  Oropharynx without lesions or exudates.  NECK:  Supple with good range of motion; no thyromegaly or masses; no JVD or bruits.  LYMPHATICS:  No cervical, supraclavicular, axillary, or " inguinal lymphadenopathy.  CHEST:  Lungs are clear to auscultation bilaterally.  No wheezes, rales, or rhonchi.  BREASTS:Both breasts were examined today.  Left TRAM flap without abnormalities.  No nodules.  No skin changes.  Prior right breast reduction.  No discrete nodules.  No nipple discharge.  HEART:  Regular rate; normal rhythm.  No murmurs, gallops or rubs.  ABDOMEN:  Soft, non-tender, non-distended.  Normal active bowel sounds.  No organomegaly.  TRAM flap incision well-healed.  EXTREMITIES:  No clubbing, cyanosis, or edema.  NEUROLOGICAL:  No focal neurologic deficits.    DIAGNOSTIC DATA:  Results for orders placed or performed in visit on 04/20/18   Retic With IRF & RET-He   Result Value Ref Range    Immature Reticulocyte Fraction 7.6 3.0 - 15.8 %    Reticulocyte % 1.06 0.60 - 2.00 %    Reticulocyte Hgb 30.8 29.8 - 36.1 pg   CBC Auto Differential   Result Value Ref Range    WBC 7.51 4.00 - 10.00 10*3/mm3    RBC 4.84 3.90 - 5.00 10*6/mm3    Hemoglobin 12.5 11.5 - 14.9 g/dL    Hematocrit 40.7 34.0 - 45.0 %    MCV 84.1 83.0 - 97.0 fL    MCH 25.8 (L) 27.0 - 33.0 pg    MCHC 30.7 (L) 32.0 - 35.0 g/dL    RDW 14.5 11.7 - 14.5 %    RDW-SD 44.0 37.0 - 49.0 fl    MPV 9.7 8.9 - 12.1 fL    Platelets 293 150 - 375 10*3/mm3    Neutrophil % 72.2 39.0 - 75.0 %    Lymphocyte % 19.4 (L) 20.0 - 49.0 %    Monocyte % 7.2 4.0 - 12.0 %    Eosinophil % 0.5 (L) 1.0 - 5.0 %    Basophil % 0.4 0.0 - 1.1 %    Immature Grans % 0.3 0.0 - 0.5 %    Neutrophils, Absolute 5.42 1.50 - 7.00 10*3/mm3    Lymphocytes, Absolute 1.46 1.00 - 3.50 10*3/mm3    Monocytes, Absolute 0.54 0.25 - 0.80 10*3/mm3    Eosinophils, Absolute 0.04 0.00 - 0.36 10*3/mm3    Basophils, Absolute 0.03 0.00 - 0.10 10*3/mm3    Immature Grans, Absolute 0.02 0.00 - 0.03 10*3/mm3    nRBC 0.0 0.0 - 0.0 /100 WBC       IMAGING:  Mammogram 8/31/2017    FINDINGS: Right breast scattered fibroglandular densities. There is now  a surgical marker in position. There is a stable  circumscribed rounded  opacity within the superior aspect of the breast again demonstrated 2.9  cm in diameter. This corresponds to the cyst demonstrated on 08/30/2016  ultrasound located at the 10 o'clock position. No suspicious mass. No  new mass. No axillary lymphadenopathy nor suspicious calcification  identified.      The left TRAM flap is stable and satisfactory in appearance.  False-negative rate at least 10%.      CONCLUSION: Benign BI-RADS Category 2, recommend monthly  self-examination and annual mammography.  ASSESSMENT:  This is a 52 y.o. female with:  1.  History of hormone receptor negative DCIS of the left breast status post mastectomy with TRAM flap.  No medical therapy has been recommended.  We may consider chemoprevention when she becomes postmenopausal.  2.  Abnormal mammogram of the right breast with biopsy showing fibroadenoma  3.  Iron deficiency anemia most likely secondary to menorrhagia.  She is on ferrous sulfate sporadically at this point.  Menstrual blood loss has lessened.  Follow-up iron studies from today.    PLAN:   1.  Continue ferrous sulfate 325 mg and we will follow-up her iron studies from today and notify her how frequently we think she needs to take this.  2.  I will see her back in 6 months for follow-up with labs dating a CBC, reticulocyte count, ferritin, and iron panel.  3.  Mammogram will be due in September.  Her last mammogram was on 8/31/2017.  This was ordered today.

## 2018-04-23 ENCOUNTER — TELEPHONE (OUTPATIENT)
Dept: ONCOLOGY | Facility: HOSPITAL | Age: 53
End: 2018-04-23

## 2018-04-23 NOTE — TELEPHONE ENCOUNTER
----- Message from Lalo Alvares MD sent at 4/22/2018  9:30 PM EDT -----  Please call the patient regarding her abnormal result.  Please let her know that her ferritin, a measure of iron storage in her body, has dropped, and she should try to take her iron supplement at least 4 days per week.  Thanks,  MARIELA      Spoke with patient, v/u.

## 2018-04-27 ENCOUNTER — OFFICE VISIT (OUTPATIENT)
Dept: OBSTETRICS AND GYNECOLOGY | Age: 53
End: 2018-04-27

## 2018-04-27 VITALS
DIASTOLIC BLOOD PRESSURE: 80 MMHG | BODY MASS INDEX: 29.76 KG/M2 | SYSTOLIC BLOOD PRESSURE: 122 MMHG | WEIGHT: 151.6 LBS | HEIGHT: 60 IN

## 2018-04-27 DIAGNOSIS — Z01.419 WELL WOMAN EXAM WITH ROUTINE GYNECOLOGICAL EXAM: Primary | ICD-10-CM

## 2018-04-27 PROCEDURE — 99396 PREV VISIT EST AGE 40-64: CPT | Performed by: OBSTETRICS & GYNECOLOGY

## 2018-04-27 NOTE — PROGRESS NOTES
Chief complaint: annual    Subjective   History of Present Illness    Renée Johns is a 52 y.o.  who presents for annual exam.  Her menses are regular every 28-30 days, lasting 3-8 days. Denies menorrhagia. H/o endometrial hyperplasia 2014 tx/ w/ Provera, until diagnosed w/ breast cancer. S/p mastectomy w/ reconstruction  10/2015 w/ Dr Zapata. DCIS. Doing well now. Getting yearly MMG. Has known uterine fibroids and declined tx. Last US  3.6 x 3 cm intramural fibroid.  Pap up to date    Obstetric History:  OB History      Para Term  AB Living    4 2 2  2 2    SAB TAB Ectopic Molar Multiple Live Births    2     2        Obstetric Comments    C/S x2, SAB w/ D&C x2         Menstrual History:     Patient's last menstrual period was 2018 (exact date).         Current contraception: tubal ligation  History of abnormal Pap smear: no  Received Gardasil immunization: no  Perform regular self breast exam: yes -    Family history of uterine or ovarian cancer: no  Family History of colon cancer: no  Family history of breast cancer: no    Mammogram: up to date.  Colonoscopy: up to date.  DEXA: not indicated.    Exercise: moderately active  Calcium/Vitamin D: adequate intake    The following portions of the patient's history were reviewed and updated as appropriate: allergies, current medications, past family history, past medical history, past social history, past surgical history and problem list.    Review of Systems   Constitutional: Negative for activity change, fatigue, fever and unexpected weight change.   Respiratory: Negative for chest tightness and shortness of breath.    Cardiovascular: Negative for chest pain, palpitations and leg swelling.   Gastrointestinal: Negative for abdominal distention, abdominal pain, blood in stool, constipation, diarrhea, nausea and vomiting.   Endocrine: Negative for cold intolerance, heat intolerance, polydipsia, polyphagia and polyuria.  "  Genitourinary: Negative.    Musculoskeletal: Negative for arthralgias and back pain.   Skin: Negative for color change.   Neurological: Negative for weakness and headaches.   Hematological: Does not bruise/bleed easily.   Psychiatric/Behavioral: Negative for confusion.       Pertinent items are noted in HPI.     Objective   Physical Exam    /80   Ht 152.4 cm (60\")   Wt 68.8 kg (151 lb 9.6 oz)   LMP 04/19/2018 (Exact Date)   BMI 29.61 kg/m²     General:   alert, appears stated age and cooperative   Neck: no asymmetry, masses, or scars   Heart: regular rate and rhythm, S1, S2 normal, no murmur, click, rub or gallop   Lungs: clear to auscultation bilaterally   Abdomen: soft, non-tender, without masses or organomegaly   Breast: left mastectomy and reconstruction scars, right reduction scars   Vulva: normal, Bartholin's, Urethra, Sturgeon's normal   Vagina: normal mucosa   Cervix: no lesions and nulliparous appearance   Uterus: anteverted, mobile, non-tender, normal shape and consistency   Adnexa: normal adnexa and no mass, fullness, tenderness   Rectal: not indicated     Assessment/Plan   Renée was seen today for gynecologic exam.    Diagnoses and all orders for this visit:    Well woman exam with routine gynecological exam        Breast self exam technique reviewed and patient encouraged to perform self-exam monthly.  Discussed healthy lifestyle modifications.  Pap smear up tod ate    Pap 10/2016 normal, HPV-, plan q 3yrs             "

## 2018-09-21 ENCOUNTER — HOSPITAL ENCOUNTER (OUTPATIENT)
Dept: MAMMOGRAPHY | Facility: HOSPITAL | Age: 53
Discharge: HOME | End: 2018-09-21
Attending: INTERNAL MEDICINE | Admitting: INTERNAL MEDICINE

## 2018-09-21 DIAGNOSIS — Z86.000 HISTORY OF DUCTAL CARCINOMA IN SITU (DCIS) OF BREAST: ICD-10-CM

## 2018-09-21 PROCEDURE — 77067 SCR MAMMO BI INCL CAD: CPT

## 2018-11-06 NOTE — PROGRESS NOTES
[facility] OUTPATIENT CLINIC FOLLOW UP VISIT    REASON FOR FOLLOW-UP:    1.  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  2.  Right reduction mammoplasty for symmetry and revision of left transverse rectus abdominis myocutaneous flap with vertical lateral coning revision and revision of the medial aspect of the mastectomy scar by Dr. Narvaez on 12/29/2015.  3.  Abnormal mammogram on 8/30/2016 with a 2.4cm mass in the right breast at the 9:00 position 7cm from the nipple.  Biopsy on 9/12/2016 showed fibroadenoma.      4.  Iron deficiency anemia      HISTORY OF PRESENT ILLNESS:  Renée Johns is a 52 y.o. female who returns today for follow up of the above issues.  I saw her initially on 11/9/2015 for hormone receptor negative DCIS of the left breast.  I recommended no medical therapy at that time with respect to the left breast.  We discussed that when she becomes postmenopausal we could consider medication for prevention of breast abnormalities in the right breast.    On 12/29/2015 she had a right reduction mammoplasty and revision of the transverse rectus abdominis flap.  She also had abnormal breast imaging of the right breast and had a biopsy on 9/12/2016 showing fibroadenoma.    On 11/14/2016 she was found to be iron deficient.  I recommended ferrous sulfate 325 mg twice daily.     She notes some abdominal discomfort with the ferrous sulfate and therefore is only taking it may be a couple of days per week.  She denies any new problems with her breasts.  She denies any other issues.  She is having menstrual periods every couple of months at this point.    PAST MEDICAL, SURGICAL, FAMILY, AND SOCIAL HISTORIES WERE REVIEWED WITH THE PATIENT AND IN THE ELECTRONIC MEDICAL RECORD, AND WERE UPDATED IF INDICATED.    ALLERGIES:  No Known Allergies    MEDICATIONS:  The  "medication list has been reviewed with the patient by the medical assistant, and the list has been updated in the electronic medical record, which I reviewed.  Medication dosages and frequencies were confirmed to be accurate.    REVIEW OF SYSTEMS:  PAIN:  See Vital Signs below.  GENERAL:  No fevers, chills, night sweats, or unintended weight loss.  SKIN:  No rashes or non-healing lesions.  HEME/LYMPH:  No abnormal bleeding.  No palpable lymphadenopathy.  EYES:  No vision changes or diplopia.  ENT:  No sore throat or difficulty swallowing.  RESPIRATORY:  No cough, shortness of breath, hemoptysis, or wheezing.  CARDIOVASCULAR:  No chest pain, palpitations, orthopnea, or dyspnea on exertion.  GASTROINTESTINAL:  No abdominal pain, nausea, vomiting, constipation, diarrhea, melena, or hematochezia.   GENITOURINARY:  No dysuria or hematuria.  MUSCULOSKELETAL:  No joint pain, swelling, or erythema.  NEUROLOGIC:  No dizziness, loss of consciousness, or seizures.  PSYCHIATRIC:  No depression, anxiety, or mood changes.    Vitals:    11/07/18 1044   BP: 120/80   Pulse: 84   Resp: 14   Temp: 98.6 °F (37 °C)   SpO2: 98%   Weight: 71.8 kg (158 lb 3.2 oz)   Height: 154.4 cm (60.79\")   PainSc: 0-No pain       PHYSICAL EXAMINATION:  GENERAL:  Well-developed well-nourished female; awake, alert and oriented, in no acute distress.  SKIN:  Warm and dry, without rashes, purpura, or petechiae.  HEAD:  Normocephalic, atraumatic.  EYES:  Pupils equal, round and reactive to light.  Extraocular movements intact.  Conjunctivae normal.  EARS:  Hearing intact.  NOSE:  Septum midline.  No excoriations or nasal discharge.  MOUTH:  No stomatitis or ulcers.  Lips are normal.  THROAT:  Oropharynx without lesions or exudates.  NECK:  Supple with good range of motion; no thyromegaly or masses; no JVD or bruits.  LYMPHATICS:  No cervical, supraclavicular, axillary, or inguinal lymphadenopathy.  CHEST:  Lungs are clear to auscultation bilaterally.  No " wheezes, rales, or rhonchi.  BREASTS:Both breasts were examined today.  Left TRAM flap without abnormalities.  No nodules.  No skin changes.  Prior right breast reduction.  No discrete nodules.  No nipple discharge.  No change  HEART:  Regular rate; normal rhythm.  No murmurs, gallops or rubs.  ABDOMEN:  Soft, non-tender, non-distended.  Normal active bowel sounds.  No organomegaly.  TRAM flap incision well-healed.  EXTREMITIES:  No clubbing, cyanosis, or edema.  NEUROLOGICAL:  No focal neurologic deficits.    DIAGNOSTIC DATA:  Results for orders placed or performed in visit on 11/07/18   Retic With IRF & RET-He   Result Value Ref Range    Immature Reticulocyte Fraction 8.6 3.0 - 15.8 %    Reticulocyte % 0.71 0.60 - 2.00 %    Reticulocyte Hgb 26.2 (L) 29.8 - 36.1 pg   CBC Auto Differential   Result Value Ref Range    WBC 7.88 4.00 - 10.00 10*3/mm3    RBC 4.74 3.90 - 5.00 10*6/mm3    Hemoglobin 11.5 11.5 - 14.9 g/dL    Hematocrit 37.7 34.0 - 45.0 %    MCV 79.5 (L) 83.0 - 97.0 fL    MCH 24.3 (L) 27.0 - 33.0 pg    MCHC 30.5 (L) 32.0 - 35.0 g/dL    RDW 14.4 11.7 - 14.5 %    RDW-SD 41.7 37.0 - 49.0 fl    MPV 9.4 8.9 - 12.1 fL    Platelets 341 150 - 375 10*3/mm3    Neutrophil % 74.0 39.0 - 75.0 %    Lymphocyte % 19.0 (L) 20.0 - 49.0 %    Monocyte % 5.3 4.0 - 12.0 %    Eosinophil % 0.8 (L) 1.0 - 5.0 %    Basophil % 0.5 0.0 - 1.1 %    Immature Grans % 0.4 0.0 - 0.5 %    Neutrophils, Absolute 5.83 1.50 - 7.00 10*3/mm3    Lymphocytes, Absolute 1.50 1.00 - 3.50 10*3/mm3    Monocytes, Absolute 0.42 0.25 - 0.80 10*3/mm3    Eosinophils, Absolute 0.06 0.00 - 0.36 10*3/mm3    Basophils, Absolute 0.04 0.00 - 0.10 10*3/mm3    Immature Grans, Absolute 0.03 0.00 - 0.03 10*3/mm3    nRBC 0.0 0.0 - 0.0 /100 WBC       IMAGING:  Mammogram 8/31/2017  CONCLUSION: Benign BI-RADS Category 2, recommend monthly  self-examination and annual mammography.    Mammogram 9/21/2018  CONCLUSION: Benign BI-RADS Category 2. Recommend  monthly  self-examination and annual mammography.    ASSESSMENT:  This is a 52 y.o. female with:  1.  History of hormone receptor negative DCIS of the left breast status post mastectomy with TRAM flap.  No medical therapy has been recommended.  We may consider chemoprevention when she becomes postmenopausal.  2.  Abnormal mammogram of the right breast with biopsy showing fibroadenoma  3.  Iron deficiency anemia most likely secondary to menorrhagia.  She is on ferrous sulfate which she is not tolerating very well at this point secondary to abdominal discomfort.  Therefore, she has not been taking it very much.  We will switch to ferrous gluconate once daily today.  Her MCV is low.  The reticulocyte hemoglobin is low.  Iron studies are pending but her hemoglobin has dropped some and therefore I think she will require more iron.    PLAN:   1.  Discontinue ferrous sulfate and start ferrous gluconate once daily.  2.  Follow up pending labs from today  3.  I will see her back in 3 months for follow-up with a CBC and reticulocyte count

## 2018-11-07 ENCOUNTER — OFFICE VISIT (OUTPATIENT)
Dept: ONCOLOGY | Facility: CLINIC | Age: 53
End: 2018-11-07

## 2018-11-07 ENCOUNTER — LAB (OUTPATIENT)
Dept: LAB | Facility: HOSPITAL | Age: 53
End: 2018-11-07

## 2018-11-07 VITALS
BODY MASS INDEX: 29.87 KG/M2 | HEART RATE: 84 BPM | SYSTOLIC BLOOD PRESSURE: 120 MMHG | RESPIRATION RATE: 14 BRPM | HEIGHT: 61 IN | DIASTOLIC BLOOD PRESSURE: 80 MMHG | TEMPERATURE: 98.6 F | OXYGEN SATURATION: 98 % | WEIGHT: 158.2 LBS

## 2018-11-07 DIAGNOSIS — Z86.000 HISTORY OF DUCTAL CARCINOMA IN SITU (DCIS) OF BREAST: Primary | ICD-10-CM

## 2018-11-07 DIAGNOSIS — D50.0 IRON DEFICIENCY ANEMIA DUE TO CHRONIC BLOOD LOSS: ICD-10-CM

## 2018-11-07 LAB
BASOPHILS # BLD AUTO: 0.04 10*3/MM3 (ref 0–0.1)
BASOPHILS NFR BLD AUTO: 0.5 % (ref 0–1.1)
DEPRECATED RDW RBC AUTO: 41.7 FL (ref 37–49)
EOSINOPHIL # BLD AUTO: 0.06 10*3/MM3 (ref 0–0.36)
EOSINOPHIL NFR BLD AUTO: 0.8 % (ref 1–5)
ERYTHROCYTE [DISTWIDTH] IN BLOOD BY AUTOMATED COUNT: 14.4 % (ref 11.7–14.5)
FERRITIN SERPL-MCNC: <5 NG/ML (ref 11–207)
HCT VFR BLD AUTO: 37.7 % (ref 34–45)
HGB BLD-MCNC: 11.5 G/DL (ref 11.5–14.9)
HGB RETIC QN: 26.2 PG (ref 29.8–36.1)
IMM GRANULOCYTES # BLD: 0.03 10*3/MM3 (ref 0–0.03)
IMM GRANULOCYTES NFR BLD: 0.4 % (ref 0–0.5)
IMM RETICS NFR: 8.6 % (ref 3–15.8)
IRON 24H UR-MRATE: 64 MCG/DL (ref 37–145)
IRON SATN MFR SERPL: 13 % (ref 14–48)
LYMPHOCYTES # BLD AUTO: 1.5 10*3/MM3 (ref 1–3.5)
LYMPHOCYTES NFR BLD AUTO: 19 % (ref 20–49)
MCH RBC QN AUTO: 24.3 PG (ref 27–33)
MCHC RBC AUTO-ENTMCNC: 30.5 G/DL (ref 32–35)
MCV RBC AUTO: 79.5 FL (ref 83–97)
MONOCYTES # BLD AUTO: 0.42 10*3/MM3 (ref 0.25–0.8)
MONOCYTES NFR BLD AUTO: 5.3 % (ref 4–12)
NEUTROPHILS # BLD AUTO: 5.83 10*3/MM3 (ref 1.5–7)
NEUTROPHILS NFR BLD AUTO: 74 % (ref 39–75)
NRBC BLD MANUAL-RTO: 0 /100 WBC (ref 0–0)
PLATELET # BLD AUTO: 341 10*3/MM3 (ref 150–375)
PMV BLD AUTO: 9.4 FL (ref 8.9–12.1)
RBC # BLD AUTO: 4.74 10*6/MM3 (ref 3.9–5)
RETICS/RBC NFR AUTO: 0.71 % (ref 0.6–2)
TIBC SERPL-MCNC: 482 MCG/DL (ref 249–505)
TRANSFERRIN SERPL-MCNC: 344 MG/DL (ref 200–360)
WBC NRBC COR # BLD: 7.88 10*3/MM3 (ref 4–10)

## 2018-11-07 PROCEDURE — 85046 RETICYTE/HGB CONCENTRATE: CPT | Performed by: INTERNAL MEDICINE

## 2018-11-07 PROCEDURE — 85025 COMPLETE CBC W/AUTO DIFF WBC: CPT | Performed by: INTERNAL MEDICINE

## 2018-11-07 PROCEDURE — 99214 OFFICE O/P EST MOD 30 MIN: CPT | Performed by: INTERNAL MEDICINE

## 2018-11-07 PROCEDURE — 84466 ASSAY OF TRANSFERRIN: CPT | Performed by: INTERNAL MEDICINE

## 2018-11-07 PROCEDURE — 82728 ASSAY OF FERRITIN: CPT | Performed by: INTERNAL MEDICINE

## 2018-11-07 PROCEDURE — 83540 ASSAY OF IRON: CPT | Performed by: INTERNAL MEDICINE

## 2018-11-07 PROCEDURE — 36415 COLL VENOUS BLD VENIPUNCTURE: CPT | Performed by: INTERNAL MEDICINE

## 2018-11-07 RX ORDER — DOXYCYCLINE HYCLATE 50 MG/1
324 CAPSULE, GELATIN COATED ORAL
Qty: 30 TABLET | Refills: 5 | Status: SHIPPED | OUTPATIENT
Start: 2018-11-07 | End: 2019-05-06

## 2018-11-07 RX ORDER — FERROUS SULFATE 325(65) MG
325 TABLET ORAL 3 TIMES DAILY
COMMUNITY
End: 2018-11-07

## 2019-02-06 ENCOUNTER — LAB (OUTPATIENT)
Dept: LAB | Facility: HOSPITAL | Age: 54
End: 2019-02-06

## 2019-02-06 ENCOUNTER — OFFICE VISIT (OUTPATIENT)
Dept: ONCOLOGY | Facility: CLINIC | Age: 54
End: 2019-02-06

## 2019-02-06 VITALS
WEIGHT: 156 LBS | TEMPERATURE: 98.5 F | DIASTOLIC BLOOD PRESSURE: 81 MMHG | BODY MASS INDEX: 29.45 KG/M2 | HEIGHT: 61 IN | HEART RATE: 81 BPM | OXYGEN SATURATION: 98 % | RESPIRATION RATE: 18 BRPM | SYSTOLIC BLOOD PRESSURE: 137 MMHG

## 2019-02-06 DIAGNOSIS — D50.0 IRON DEFICIENCY ANEMIA DUE TO CHRONIC BLOOD LOSS: Primary | ICD-10-CM

## 2019-02-06 DIAGNOSIS — Z86.000 HISTORY OF DUCTAL CARCINOMA IN SITU (DCIS) OF BREAST: ICD-10-CM

## 2019-02-06 DIAGNOSIS — D50.0 IRON DEFICIENCY ANEMIA DUE TO CHRONIC BLOOD LOSS: ICD-10-CM

## 2019-02-06 LAB
BASOPHILS # BLD AUTO: 0.05 10*3/MM3 (ref 0–0.1)
BASOPHILS NFR BLD AUTO: 0.7 % (ref 0–1.1)
DEPRECATED RDW RBC AUTO: 46.9 FL (ref 37–49)
EOSINOPHIL # BLD AUTO: 0.06 10*3/MM3 (ref 0–0.36)
EOSINOPHIL NFR BLD AUTO: 0.8 % (ref 1–5)
ERYTHROCYTE [DISTWIDTH] IN BLOOD BY AUTOMATED COUNT: 15.4 % (ref 11.7–14.5)
HCT VFR BLD AUTO: 42.1 % (ref 34–45)
HGB BLD-MCNC: 13.4 G/DL (ref 11.5–14.9)
HGB RETIC QN AUTO: 30.8 PG (ref 29.8–36.1)
IMM GRANULOCYTES # BLD AUTO: 0.05 10*3/MM3 (ref 0–0.03)
IMM GRANULOCYTES NFR BLD AUTO: 0.7 % (ref 0–0.5)
IMM RETICS NFR: 5.7 % (ref 3–15.8)
LYMPHOCYTES # BLD AUTO: 1.56 10*3/MM3 (ref 1–3.5)
LYMPHOCYTES NFR BLD AUTO: 20.9 % (ref 20–49)
MCH RBC QN AUTO: 26.2 PG (ref 27–33)
MCHC RBC AUTO-ENTMCNC: 31.8 G/DL (ref 32–35)
MCV RBC AUTO: 82.4 FL (ref 83–97)
MONOCYTES # BLD AUTO: 0.49 10*3/MM3 (ref 0.25–0.8)
MONOCYTES NFR BLD AUTO: 6.6 % (ref 4–12)
NEUTROPHILS # BLD AUTO: 5.26 10*3/MM3 (ref 1.5–7)
NEUTROPHILS NFR BLD AUTO: 70.3 % (ref 39–75)
NRBC BLD AUTO-RTO: 0 /100 WBC (ref 0–0)
PLATELET # BLD AUTO: 281 10*3/MM3 (ref 150–375)
PMV BLD AUTO: 10 FL (ref 8.9–12.1)
RBC # BLD AUTO: 5.11 10*6/MM3 (ref 3.9–5)
RETICS/RBC NFR AUTO: 0.88 % (ref 0.6–2)
WBC NRBC COR # BLD: 7.47 10*3/MM3 (ref 4–10)

## 2019-02-06 PROCEDURE — 36415 COLL VENOUS BLD VENIPUNCTURE: CPT | Performed by: INTERNAL MEDICINE

## 2019-02-06 PROCEDURE — 85046 RETICYTE/HGB CONCENTRATE: CPT | Performed by: INTERNAL MEDICINE

## 2019-02-06 PROCEDURE — 85025 COMPLETE CBC W/AUTO DIFF WBC: CPT | Performed by: INTERNAL MEDICINE

## 2019-02-06 PROCEDURE — 99213 OFFICE O/P EST LOW 20 MIN: CPT | Performed by: INTERNAL MEDICINE

## 2019-02-06 NOTE — PROGRESS NOTES
[facility] OUTPATIENT CLINIC FOLLOW UP VISIT    REASON FOR FOLLOW-UP:    1.  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  2.  Right reduction mammoplasty for symmetry and revision of left transverse rectus abdominis myocutaneous flap with vertical lateral coning revision and revision of the medial aspect of the mastectomy scar by Dr. Narvaez on 12/29/2015.  3.  Abnormal mammogram on 8/30/2016 with a 2.4cm mass in the right breast at the 9:00 position 7cm from the nipple.  Biopsy on 9/12/2016 showed fibroadenoma.      4.  Iron deficiency anemia      HISTORY OF PRESENT ILLNESS:  Renée Johns is a 53 y.o. female who returns today for follow up of the above issues.     At her last visit due to GI intolerance I recommended ferrous gluconate rather than ferrous sulfate.  She is taking this once daily and tolerating it without any difficulty.  She denies any new problems today otherwise.  No new problems with her breasts.    PAST MEDICAL, SURGICAL, FAMILY, AND SOCIAL HISTORIES WERE REVIEWED WITH THE PATIENT AND IN THE ELECTRONIC MEDICAL RECORD, AND WERE UPDATED IF INDICATED.    ALLERGIES:  No Known Allergies    MEDICATIONS:  The medication list has been reviewed with the patient by the medical assistant, and the list has been updated in the electronic medical record, which I reviewed.  Medication dosages and frequencies were confirmed to be accurate.    REVIEW OF SYSTEMS:  PAIN:  See Vital Signs below.  GENERAL:  No fevers, chills, night sweats, or unintended weight loss.  SKIN:  No rashes or non-healing lesions.  HEME/LYMPH:  No abnormal bleeding.  No palpable lymphadenopathy.  EYES:  No vision changes or diplopia.  ENT:  No sore throat or difficulty swallowing.  RESPIRATORY:  No cough, shortness of breath, hemoptysis, or wheezing.  CARDIOVASCULAR:  No chest pain,  "palpitations, orthopnea, or dyspnea on exertion.  GASTROINTESTINAL:  No abdominal pain, nausea, vomiting, constipation, diarrhea, melena, or hematochezia.   GENITOURINARY:  No dysuria or hematuria.  MUSCULOSKELETAL:  No joint pain, swelling, or erythema.  NEUROLOGIC:  No dizziness, loss of consciousness, or seizures.  PSYCHIATRIC:  No depression, anxiety, or mood changes.    Vitals:    02/06/19 1058   BP: 137/81   Pulse: 81   Resp: 18   Temp: 98.5 °F (36.9 °C)   TempSrc: Oral   SpO2: 98%   Weight: 70.8 kg (156 lb)   Height: 154.4 cm (60.79\")   PainSc: 0-No pain  Comment: breast cancer       PHYSICAL EXAMINATION:  GENERAL:  Well-developed well-nourished female; awake, alert and oriented, in no acute distress.  SKIN:  Warm and dry, without rashes, purpura, or petechiae.  HEAD:  Normocephalic, atraumatic.  EARS:  Hearing intact.  NOSE:  Septum midline.  No excoriations or nasal discharge.  MOUTH:  No stomatitis or ulcers.  Lips are normal.  THROAT:  Oropharynx without lesions or exudates.  NECK:  Supple with good range of motion; no thyromegaly or masses; no JVD or bruits.  LYMPHATICS:  No cervical, supraclavicular, axillary lymphadenopathy  CHEST:  Lungs are clear to auscultation bilaterally.  No wheezes, rales, or rhonchi.  BREASTS: Not examined today  HEART:  Regular rate; normal rhythm.  No murmurs, gallops or rubs.  ABDOMEN:  Not examined today  EXTREMITIES:  No clubbing, cyanosis, or edema.  NEUROLOGICAL:  No focal neurologic deficits.    DIAGNOSTIC DATA:  Results for orders placed or performed in visit on 02/06/19   CBC Auto Differential   Result Value Ref Range    WBC 7.47 4.00 - 10.00 10*3/mm3    RBC 5.11 (H) 3.90 - 5.00 10*6/mm3    Hemoglobin 13.4 11.5 - 14.9 g/dL    Hematocrit 42.1 34.0 - 45.0 %    MCV 82.4 (L) 83.0 - 97.0 fL    MCH 26.2 (L) 27.0 - 33.0 pg    MCHC 31.8 (L) 32.0 - 35.0 g/dL    RDW 15.4 (H) 11.7 - 14.5 %    RDW-SD 46.9 37.0 - 49.0 fl    MPV 10.0 8.9 - 12.1 fL    Platelets 281 150 - 375 " 10*3/mm3    Neutrophil % 70.3 39.0 - 75.0 %    Lymphocyte % 20.9 20.0 - 49.0 %    Monocyte % 6.6 4.0 - 12.0 %    Eosinophil % 0.8 (L) 1.0 - 5.0 %    Basophil % 0.7 0.0 - 1.1 %    Immature Grans % 0.7 (H) 0.0 - 0.5 %    Neutrophils, Absolute 5.26 1.50 - 7.00 10*3/mm3    Lymphocytes, Absolute 1.56 1.00 - 3.50 10*3/mm3    Monocytes, Absolute 0.49 0.25 - 0.80 10*3/mm3    Eosinophils, Absolute 0.06 0.00 - 0.36 10*3/mm3    Basophils, Absolute 0.05 0.00 - 0.10 10*3/mm3    Immature Grans, Absolute 0.05 (H) 0.00 - 0.03 10*3/mm3    nRBC 0.0 0.0 - 0.0 /100 WBC       IMAGING:  Mammogram 8/31/2017  CONCLUSION: Benign BI-RADS Category 2, recommend monthly  self-examination and annual mammography.    Mammogram 9/21/2018  CONCLUSION: Benign BI-RADS Category 2. Recommend monthly  self-examination and annual mammography.    ASSESSMENT:  This is a 53 y.o. female with:  1.  History of hormone receptor negative DCIS of the left breast status post mastectomy with TRAM flap.  No medical therapy has been recommended.  We may consider chemoprevention when she becomes postmenopausal.  2.  Abnormal mammogram of the right breast with biopsy showing fibroadenoma  3.  Iron deficiency anemia most likely secondary to menorrhagia.  She was not tolerating ferrous sulfate very well and therefore at her last visit we made a transition to ferrous gluconate.  She is tolerating this much better.  Her hemoglobin is better.  Her MCV is improving but remains slightly low.  I recommended that she continue the ferrous gluconate once daily at this time.    PLAN:   1.  Continue ferrous gluconate once daily  2.  I will see her back in about 3 months for follow-up with a CBC, reticulocyte count, ferritin, and iron profile.

## 2019-04-29 ENCOUNTER — OFFICE VISIT (OUTPATIENT)
Dept: ONCOLOGY | Facility: CLINIC | Age: 54
End: 2019-04-29

## 2019-04-29 ENCOUNTER — LAB (OUTPATIENT)
Dept: LAB | Facility: HOSPITAL | Age: 54
End: 2019-04-29

## 2019-04-29 VITALS
BODY MASS INDEX: 29.72 KG/M2 | TEMPERATURE: 98.4 F | HEIGHT: 61 IN | WEIGHT: 157.4 LBS | OXYGEN SATURATION: 98 % | SYSTOLIC BLOOD PRESSURE: 141 MMHG | DIASTOLIC BLOOD PRESSURE: 86 MMHG | RESPIRATION RATE: 16 BRPM | HEART RATE: 76 BPM

## 2019-04-29 DIAGNOSIS — D50.0 IRON DEFICIENCY ANEMIA DUE TO CHRONIC BLOOD LOSS: ICD-10-CM

## 2019-04-29 DIAGNOSIS — Z86.000 HISTORY OF DUCTAL CARCINOMA IN SITU (DCIS) OF BREAST: Primary | ICD-10-CM

## 2019-04-29 LAB
BASOPHILS # BLD AUTO: 0.03 10*3/MM3 (ref 0–0.2)
BASOPHILS NFR BLD AUTO: 0.4 % (ref 0–1.5)
DEPRECATED RDW RBC AUTO: 43.6 FL (ref 37–54)
EOSINOPHIL # BLD AUTO: 0.05 10*3/MM3 (ref 0–0.4)
EOSINOPHIL NFR BLD AUTO: 0.7 % (ref 0.3–6.2)
ERYTHROCYTE [DISTWIDTH] IN BLOOD BY AUTOMATED COUNT: 13.9 % (ref 12.3–15.4)
FERRITIN SERPL-MCNC: 11 NG/ML (ref 11–207)
HCT VFR BLD AUTO: 40.3 % (ref 34–46.6)
HGB BLD-MCNC: 12.8 G/DL (ref 12–15.9)
HGB RETIC QN AUTO: 30.5 PG (ref 29.8–36.1)
IMM GRANULOCYTES # BLD AUTO: 0.03 10*3/MM3 (ref 0–0.05)
IMM GRANULOCYTES NFR BLD AUTO: 0.4 % (ref 0–0.5)
IMM RETICS NFR: 8.2 % (ref 3–15.8)
IRON 24H UR-MRATE: 31 MCG/DL (ref 37–145)
IRON SATN MFR SERPL: 8 % (ref 14–48)
LYMPHOCYTES # BLD AUTO: 1.79 10*3/MM3 (ref 0.7–3.1)
LYMPHOCYTES NFR BLD AUTO: 24.8 % (ref 19.6–45.3)
MCH RBC QN AUTO: 27.2 PG (ref 26.6–33)
MCHC RBC AUTO-ENTMCNC: 31.8 G/DL (ref 31.5–35.7)
MCV RBC AUTO: 85.7 FL (ref 79–97)
MONOCYTES # BLD AUTO: 0.56 10*3/MM3 (ref 0.1–0.9)
MONOCYTES NFR BLD AUTO: 7.8 % (ref 5–12)
NEUTROPHILS # BLD AUTO: 4.76 10*3/MM3 (ref 1.7–7)
NEUTROPHILS NFR BLD AUTO: 65.9 % (ref 42.7–76)
NRBC BLD AUTO-RTO: 0 /100 WBC (ref 0–0.2)
PLATELET # BLD AUTO: 323 10*3/MM3 (ref 140–450)
PMV BLD AUTO: 9.2 FL (ref 6–12)
RBC # BLD AUTO: 4.7 10*6/MM3 (ref 3.77–5.28)
RETICS/RBC NFR AUTO: 1.13 % (ref 0.7–1.9)
TIBC SERPL-MCNC: 384 MCG/DL (ref 249–505)
TRANSFERRIN SERPL-MCNC: 274 MG/DL (ref 200–360)
WBC NRBC COR # BLD: 7.22 10*3/MM3 (ref 3.4–10.8)

## 2019-04-29 PROCEDURE — 85025 COMPLETE CBC W/AUTO DIFF WBC: CPT | Performed by: INTERNAL MEDICINE

## 2019-04-29 PROCEDURE — 85046 RETICYTE/HGB CONCENTRATE: CPT | Performed by: INTERNAL MEDICINE

## 2019-04-29 PROCEDURE — 84466 ASSAY OF TRANSFERRIN: CPT | Performed by: INTERNAL MEDICINE

## 2019-04-29 PROCEDURE — 36415 COLL VENOUS BLD VENIPUNCTURE: CPT | Performed by: INTERNAL MEDICINE

## 2019-04-29 PROCEDURE — 83540 ASSAY OF IRON: CPT | Performed by: INTERNAL MEDICINE

## 2019-04-29 PROCEDURE — 99213 OFFICE O/P EST LOW 20 MIN: CPT | Performed by: INTERNAL MEDICINE

## 2019-04-29 PROCEDURE — 82728 ASSAY OF FERRITIN: CPT | Performed by: INTERNAL MEDICINE

## 2019-04-29 NOTE — PROGRESS NOTES
[facility] OUTPATIENT CLINIC FOLLOW UP VISIT    REASON FOR FOLLOW-UP:    1.  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  2.  Right reduction mammoplasty for symmetry and revision of left transverse rectus abdominis myocutaneous flap with vertical lateral coning revision and revision of the medial aspect of the mastectomy scar by Dr. Narvaez on 12/29/2015.  3.  Abnormal mammogram on 8/30/2016 with a 2.4cm mass in the right breast at the 9:00 position 7cm from the nipple.  Biopsy on 9/12/2016 showed fibroadenoma.      4.  Iron deficiency anemia      HISTORY OF PRESENT ILLNESS:  Renée Johns is a 53 y.o. female who returns today for follow up of the above issues.     She continues ferrous gluconate once daily which she tolerates well.  She continues to have irregular menstrual bleeding.  She denies any new problems with her breasts.    PAST MEDICAL, SURGICAL, FAMILY, AND SOCIAL HISTORIES WERE REVIEWED WITH THE PATIENT AND IN THE ELECTRONIC MEDICAL RECORD, AND WERE UPDATED IF INDICATED.    ALLERGIES:  No Known Allergies    MEDICATIONS:  The medication list has been reviewed with the patient by the medical assistant, and the list has been updated in the electronic medical record, which I reviewed.  Medication dosages and frequencies were confirmed to be accurate.    REVIEW OF SYSTEMS:  PAIN:  See Vital Signs below.  GENERAL:  No fevers, chills, night sweats, or unintended weight loss.  SKIN:  No rashes or non-healing lesions.  HEME/LYMPH:  No abnormal bleeding.  No palpable lymphadenopathy.  EYES:  No vision changes or diplopia.  ENT:  No sore throat or difficulty swallowing.  RESPIRATORY:  No cough, shortness of breath, hemoptysis, or wheezing.  CARDIOVASCULAR:  No chest pain, palpitations, orthopnea, or dyspnea on exertion.  GASTROINTESTINAL:  No abdominal pain,  "nausea, vomiting, constipation, diarrhea, melena, or hematochezia.   GENITOURINARY:  No dysuria or hematuria.  Irregular menstrual bleeding  MUSCULOSKELETAL:  No joint pain, swelling, or erythema.  NEUROLOGIC:  No dizziness, loss of consciousness, or seizures.  PSYCHIATRIC:  No depression, anxiety, or mood changes.    Vitals:    04/29/19 1058   BP: 141/86   Pulse: 76   Resp: 16   Temp: 98.4 °F (36.9 °C)   TempSrc: Oral   SpO2: 98%   Weight: 71.4 kg (157 lb 6.4 oz)   Height: 154.4 cm (60.79\")   PainSc: 0-No pain  Comment: anemia       PHYSICAL EXAMINATION:  GENERAL:  Well-developed well-nourished female; awake, alert and oriented, in no acute distress.  SKIN:  Warm and dry, without rashes, purpura, or petechiae.  HEAD:  Normocephalic, atraumatic.  EARS:  Hearing intact.  NOSE:  Septum midline.  No excoriations or nasal discharge.  MOUTH:  No stomatitis or ulcers.  Lips are normal.  THROAT:  Oropharynx without lesions or exudates.  LYMPHATICS:  No cervical, supraclavicular, axillary lymphadenopathy  CHEST:  Lungs are clear to auscultation bilaterally.  No wheezes, rales, or rhonchi.  BREASTS: Not examined today  HEART:  Regular rate; normal rhythm.  No murmurs, gallops or rubs.  ABDOMEN:  Not examined today  EXTREMITIES:  No clubbing, cyanosis, or edema.  NEUROLOGICAL:  No focal neurologic deficits.    DIAGNOSTIC DATA:  Results for orders placed or performed in visit on 04/29/19   Retic With IRF & RET-He   Result Value Ref Range    Immature Reticulocyte Fraction 8.2 3.0 - 15.8 %    Reticulocyte % 1.13 0.70 - 1.90 %    Reticulocyte Hgb 30.5 29.8 - 36.1 pg   CBC Auto Differential   Result Value Ref Range    WBC 7.22 3.40 - 10.80 10*3/mm3    RBC 4.70 3.77 - 5.28 10*6/mm3    Hemoglobin 12.8 12.0 - 15.9 g/dL    Hematocrit 40.3 34.0 - 46.6 %    MCV 85.7 79.0 - 97.0 fL    MCH 27.2 26.6 - 33.0 pg    MCHC 31.8 31.5 - 35.7 g/dL    RDW 13.9 12.3 - 15.4 %    RDW-SD 43.6 37.0 - 54.0 fl    MPV 9.2 6.0 - 12.0 fL    Platelets 323 140 " - 450 10*3/mm3    Neutrophil % 65.9 42.7 - 76.0 %    Lymphocyte % 24.8 19.6 - 45.3 %    Monocyte % 7.8 5.0 - 12.0 %    Eosinophil % 0.7 0.3 - 6.2 %    Basophil % 0.4 0.0 - 1.5 %    Immature Grans % 0.4 0.0 - 0.5 %    Neutrophils, Absolute 4.76 1.70 - 7.00 10*3/mm3    Lymphocytes, Absolute 1.79 0.70 - 3.10 10*3/mm3    Monocytes, Absolute 0.56 0.10 - 0.90 10*3/mm3    Eosinophils, Absolute 0.05 0.00 - 0.40 10*3/mm3    Basophils, Absolute 0.03 0.00 - 0.20 10*3/mm3    Immature Grans, Absolute 0.03 0.00 - 0.05 10*3/mm3    nRBC 0.0 0.0 - 0.2 /100 WBC       IMAGING:  Mammogram 8/31/2017  CONCLUSION: Benign BI-RADS Category 2, recommend monthly  self-examination and annual mammography.    Mammogram 9/21/2018  CONCLUSION: Benign BI-RADS Category 2. Recommend monthly  self-examination and annual mammography.    ASSESSMENT:  This is a 53 y.o. female with:  1.  History of hormone receptor negative DCIS of the left breast status post mastectomy with TRAM flap.  No medical therapy has been recommended.  We may consider chemoprevention when she becomes postmenopausal.  2.  Abnormal mammogram of the right breast with biopsy showing fibroadenoma  3.  Iron deficiency anemia most likely secondary to menorrhagia.  She was not tolerating ferrous sulfate very well and therefore we made a transition to ferrous gluconate.  She is tolerating this much better.  Her hemoglobin is normal.  Her MCV is normal.  I recommended that she continue the ferrous gluconate once daily for now.      PLAN:   1.  Continue ferrous gluconate once daily  2.  Follow-up pending labs from today including a ferritin and iron panel and I will notify her of the results  3.  Mammogram in September  4.  Follow-up in 6 months with a CBC and reticulocyte count

## 2019-04-30 ENCOUNTER — TELEPHONE (OUTPATIENT)
Dept: ONCOLOGY | Facility: CLINIC | Age: 54
End: 2019-04-30

## 2019-04-30 NOTE — TELEPHONE ENCOUNTER
----- Message from Lalo Alvares MD sent at 4/30/2019 12:12 PM EDT -----  Please call the patient regarding her result.  Let her know that her iron storage is a little better at this point and she should continue her iron supplement once daily as she has been doing.  Thanks,  MARIELA

## 2019-05-01 ENCOUNTER — OFFICE VISIT (OUTPATIENT)
Dept: OBSTETRICS AND GYNECOLOGY | Age: 54
End: 2019-05-01

## 2019-05-01 VITALS
HEIGHT: 60 IN | DIASTOLIC BLOOD PRESSURE: 82 MMHG | BODY MASS INDEX: 30.67 KG/M2 | SYSTOLIC BLOOD PRESSURE: 126 MMHG | WEIGHT: 156.2 LBS

## 2019-05-01 DIAGNOSIS — D25.1 INTRAMURAL LEIOMYOMA OF UTERUS: ICD-10-CM

## 2019-05-01 DIAGNOSIS — Z11.51 ENCOUNTER FOR SCREENING FOR HUMAN PAPILLOMAVIRUS (HPV): ICD-10-CM

## 2019-05-01 DIAGNOSIS — Z01.419 WELL WOMAN EXAM WITH ROUTINE GYNECOLOGICAL EXAM: Primary | ICD-10-CM

## 2019-05-01 PROBLEM — D25.9 UTERINE FIBROID: Status: ACTIVE | Noted: 2019-05-01

## 2019-05-01 PROCEDURE — 99396 PREV VISIT EST AGE 40-64: CPT | Performed by: OBSTETRICS & GYNECOLOGY

## 2019-05-01 NOTE — PROGRESS NOTES
Chief complaint: annual    Subjective   History of Present Illness    Renée Johns is a 53 y.o.  who presents for annual exam. Pt w/ left breast cancer s/p mastectomy and reconstruction for DCIS . Did have right breast biopsy 2016 (fibroadenoma). H/o endometrial hyperplasia without atypia 2014 tx w/ progesterone (before breast cancer) and recheck EMB benign. Pt is known to have uterine fibroids with menorrhagia and anemia. She takes iron.    Obstetric History:  OB History      Para Term  AB Living    4 2 2   2 2    SAB TAB Ectopic Molar Multiple Live Births    2         2        Obstetric Comments    C/S x2, SAB w/ D&C x2         Menstrual History:     Patient's last menstrual period was 2019 (exact date).         Current contraception: tubal ligation  History of abnormal Pap smear: no  Received Gardasil immunization: no  Perform regular self breast exam: yes -    Family history of uterine or ovarian cancer: no  Family History of colon cancer: no  Family history of breast cancer: yes - pt dx <50    Mammogram: up to date.  Colonoscopy: up to date.  DEXA: not indicated.    Exercise: moderately active  Calcium/Vitamin D: adequate intake    The following portions of the patient's history were reviewed and updated as appropriate: allergies, current medications, past family history, past medical history, past social history, past surgical history and problem list.    Review of Systems   Constitutional: Negative for activity change, fatigue, fever and unexpected weight change.   Respiratory: Negative for chest tightness and shortness of breath.    Cardiovascular: Negative for chest pain, palpitations and leg swelling.   Gastrointestinal: Negative for abdominal distention, abdominal pain, blood in stool, constipation, diarrhea, nausea and vomiting.   Endocrine: Negative for cold intolerance, heat intolerance, polydipsia, polyphagia and polyuria.   Genitourinary: Negative.   "  Musculoskeletal: Negative for arthralgias and back pain.   Skin: Negative for color change.   Neurological: Negative for weakness and headaches.   Hematological: Does not bruise/bleed easily.   Psychiatric/Behavioral: Negative for confusion.       Pertinent items are noted in HPI.     Objective   Physical Exam    /82   Ht 152.4 cm (60\")   Wt 70.9 kg (156 lb 3.2 oz)   LMP 04/21/2019 (Exact Date)   Breastfeeding? No   BMI 30.51 kg/m²     General:   alert, appears stated age and cooperative   Neck: no asymmetry, masses, or scars   Heart: regular rate and rhythm, S1, S2 normal, no murmur, click, rub or gallop   Lungs: clear to auscultation bilaterally   Abdomen: soft, non-tender, without masses or organomegaly   Breast: inspection negative, no nipple discharge or bleeding, no masses or nodularity palpable   Vulva: normal, Bartholin's, Urethra, [place]'s normal   Vagina: normal mucosa   Cervix: multiparous appearance, no bleeding following Pap, no cervical motion tenderness and no lesions   Uterus: anteverted, bulky, size consistent with 12 weeks, irregular   Adnexa: normal adnexa and no mass, fullness, tenderness   Rectal: not indicated     Assessment/Plan   Renée was seen today for gynecologic exam.    Diagnoses and all orders for this visit:    Well woman exam with routine gynecological exam  -     PapIG, HPV, Rfx 16 / 18    Encounter for screening for human papillomavirus (HPV)  -     PapIG, HPV, Rfx 16 / 18    Intramural leiomyoma of uterus    discussed BRCA gene testing due to h/o breast cancer <50, info given for Sonja    2014 noted 3.7 x 3cm intramural fibroid, recheck gyn US as uterus feels enlarged and irregular, unable to use any hormones for treatment    Breast self exam technique reviewed and patient encouraged to perform self-exam monthly.  Discussed healthy lifestyle modifications.  Pap smear sent                 "

## 2019-05-03 LAB
CYTOLOGIST CVX/VAG CYTO: NORMAL
CYTOLOGY CVX/VAG DOC THIN PREP: NORMAL
DX ICD CODE: NORMAL
HIV 1 & 2 AB SER-IMP: NORMAL
HPV I/H RISK 1 DNA CVX QL PROBE+SIG AMP: NEGATIVE
OTHER STN SPEC: NORMAL
PATH REPORT.FINAL DX SPEC: NORMAL
STAT OF ADQ CVX/VAG CYTO-IMP: NORMAL

## 2019-05-07 ENCOUNTER — TELEPHONE (OUTPATIENT)
Dept: OBSTETRICS AND GYNECOLOGY | Age: 54
End: 2019-05-07

## 2019-05-10 ENCOUNTER — OFFICE VISIT (OUTPATIENT)
Dept: OBSTETRICS AND GYNECOLOGY | Age: 54
End: 2019-05-10

## 2019-05-10 ENCOUNTER — PROCEDURE VISIT (OUTPATIENT)
Dept: OBSTETRICS AND GYNECOLOGY | Age: 54
End: 2019-05-10

## 2019-05-10 VITALS
DIASTOLIC BLOOD PRESSURE: 80 MMHG | SYSTOLIC BLOOD PRESSURE: 122 MMHG | WEIGHT: 158 LBS | HEIGHT: 60 IN | BODY MASS INDEX: 31.02 KG/M2

## 2019-05-10 DIAGNOSIS — D25.1 INTRAMURAL LEIOMYOMA OF UTERUS: Primary | ICD-10-CM

## 2019-05-10 DIAGNOSIS — N92.0 MENORRHAGIA WITH REGULAR CYCLE: Primary | ICD-10-CM

## 2019-05-10 DIAGNOSIS — D25.1 FIBROIDS, INTRAMURAL: ICD-10-CM

## 2019-05-10 PROBLEM — D25.9 UTERINE FIBROID: Status: RESOLVED | Noted: 2019-05-01 | Resolved: 2019-05-10

## 2019-05-10 PROCEDURE — 99213 OFFICE O/P EST LOW 20 MIN: CPT | Performed by: OBSTETRICS & GYNECOLOGY

## 2019-05-10 PROCEDURE — 76830 TRANSVAGINAL US NON-OB: CPT | Performed by: OBSTETRICS & GYNECOLOGY

## 2019-05-10 NOTE — PROGRESS NOTES
"Subjective     Chief Complaint   Patient presents with   • Follow-up     gyn u/s       Renée Johns is a 53 y.o.  whose LMP is Patient's last menstrual period was 2019 (exact date). presents for gyn US due to menorrhagia and known fibroids. 2014 had 3.7 x 3 cm fibroid. Exam noted an enlarged irregular fibroid uterus. Unable to use hormones due to previous DCIS dx . H/o simple endometrial hyperplasia without atypia tx w/ provera , recheck benign. Pt desires intervention for the menorrhagia.      No Additional Complaints Reported    The following portions of the patient's history were reviewed and updated as appropriate:vital signs, allergies, current medications, past family history, past medical history, past social history, past surgical history and problem list      Review of Systems   A comprehensive review of systems was negative except for: Genitourinary: positive for menorrhagia and dysmenorrhea     Objective      /80   Ht 152.4 cm (60\")   Wt 71.7 kg (158 lb)   LMP 2019 (Exact Date)   BMI 30.86 kg/m²     Physical Exam    General:   alert, appears stated age and no distress   Heart:    Lungs:    Breast:    Neck:    Abdomen:    CVA:    Pelvis:    Extremities:    Neurologic: negative   Psychiatric: Normal affect, judgement, and mood       Lab Review   Labs: 2019 pap normal HPV-     Imaging   Ultrasound - Pelvic Vaginal  Uterus 10 x 10 x  7.8 cm EML 7.7 mm, fibroids measure 2.3 x 2.1 cm and 4.5 x 4 cm, simple left ovarian cyst/follicle 2.7 x 2.6 cm    Assessment/Plan     ASSESSMENT  1. Menorrhagia with regular cycle    2. Fibroids, intramural    uterus significantly enlarged since     PLAN  1.   Orders Placed This Encounter   Procedures   • Ambulatory Referral to Family Practice   refer to interventional radiology for consult re: Uterine fibroid embolization.  Pt declines observation  Also discussed hysterectomy for treatment (ALEKSANDRA/BSO)  Unable to use any " hormones due to prior breast cancer    2. Medications prescribed this encounter:      No orders of the defined types were placed in this encounter.          Follow up: 6 month(s) for US if not done at interventional radiology    Iliana Payne MD  5/10/2019

## 2019-09-24 ENCOUNTER — APPOINTMENT (OUTPATIENT)
Dept: MAMMOGRAPHY | Facility: HOSPITAL | Age: 54
End: 2019-09-24

## 2019-09-24 ENCOUNTER — HOSPITAL ENCOUNTER (OUTPATIENT)
Dept: MAMMOGRAPHY | Facility: HOSPITAL | Age: 54
Discharge: HOME | End: 2019-09-24
Admitting: INTERNAL MEDICINE

## 2019-09-24 ENCOUNTER — TELEPHONE (OUTPATIENT)
Dept: ONCOLOGY | Facility: CLINIC | Age: 54
End: 2019-09-24

## 2019-09-24 DIAGNOSIS — R92.8 ABNORMAL SCREENING MAMMOGRAM: ICD-10-CM

## 2019-09-24 DIAGNOSIS — Z86.000 HISTORY OF DUCTAL CARCINOMA IN SITU (DCIS) OF BREAST: Primary | ICD-10-CM

## 2019-09-24 DIAGNOSIS — Z86.000 HISTORY OF DUCTAL CARCINOMA IN SITU (DCIS) OF BREAST: ICD-10-CM

## 2019-09-24 PROCEDURE — 77067 SCR MAMMO BI INCL CAD: CPT

## 2019-09-24 PROCEDURE — 77063 BREAST TOMOSYNTHESIS BI: CPT

## 2019-09-24 NOTE — PROGRESS NOTES
Aiden,    It looks like her mammogram of the right breast is abnormal.  Can you please let her know and order the diagnostic imaging?  Thanks, MARIELA

## 2019-09-24 NOTE — TELEPHONE ENCOUNTER
Per Dr Alvares request.  I attempted to contact pt regarding  Her screening mammogram showing abnormality.  Order placed for right breast diag mammogram and right breast US.  Message left for pt to call our office  Message to appt desk to arrange mammo and US

## 2019-09-30 ENCOUNTER — HOSPITAL ENCOUNTER (OUTPATIENT)
Dept: MAMMOGRAPHY | Facility: HOSPITAL | Age: 54
Discharge: HOME | End: 2019-09-30
Admitting: INTERNAL MEDICINE

## 2019-09-30 DIAGNOSIS — R92.8 ABNORMAL SCREENING MAMMOGRAM: ICD-10-CM

## 2019-09-30 DIAGNOSIS — Z86.000 HISTORY OF DUCTAL CARCINOMA IN SITU (DCIS) OF BREAST: ICD-10-CM

## 2019-09-30 PROCEDURE — 77065 DX MAMMO INCL CAD UNI: CPT

## 2019-10-21 ENCOUNTER — APPOINTMENT (OUTPATIENT)
Dept: ONCOLOGY | Facility: CLINIC | Age: 54
End: 2019-10-21

## 2019-10-21 ENCOUNTER — APPOINTMENT (OUTPATIENT)
Dept: LAB | Facility: HOSPITAL | Age: 54
End: 2019-10-21

## 2019-10-31 ENCOUNTER — OFFICE VISIT (OUTPATIENT)
Dept: ONCOLOGY | Facility: CLINIC | Age: 54
End: 2019-10-31

## 2019-10-31 ENCOUNTER — LAB (OUTPATIENT)
Dept: LAB | Facility: HOSPITAL | Age: 54
End: 2019-10-31

## 2019-10-31 VITALS
DIASTOLIC BLOOD PRESSURE: 80 MMHG | BODY MASS INDEX: 26.87 KG/M2 | RESPIRATION RATE: 16 BRPM | OXYGEN SATURATION: 97 % | WEIGHT: 142.3 LBS | SYSTOLIC BLOOD PRESSURE: 114 MMHG | HEART RATE: 83 BPM | HEIGHT: 61 IN | TEMPERATURE: 98.3 F

## 2019-10-31 DIAGNOSIS — Z86.000 HISTORY OF DUCTAL CARCINOMA IN SITU (DCIS) OF BREAST: ICD-10-CM

## 2019-10-31 DIAGNOSIS — D50.0 IRON DEFICIENCY ANEMIA DUE TO CHRONIC BLOOD LOSS: Primary | ICD-10-CM

## 2019-10-31 DIAGNOSIS — D50.0 IRON DEFICIENCY ANEMIA DUE TO CHRONIC BLOOD LOSS: ICD-10-CM

## 2019-10-31 LAB
BASOPHILS # BLD AUTO: 0.04 10*3/MM3 (ref 0–0.2)
BASOPHILS NFR BLD AUTO: 0.5 % (ref 0–1.5)
DEPRECATED RDW RBC AUTO: 41.1 FL (ref 37–54)
EOSINOPHIL # BLD AUTO: 0.08 10*3/MM3 (ref 0–0.4)
EOSINOPHIL NFR BLD AUTO: 1 % (ref 0.3–6.2)
ERYTHROCYTE [DISTWIDTH] IN BLOOD BY AUTOMATED COUNT: 13 % (ref 12.3–15.4)
HCT VFR BLD AUTO: 42.9 % (ref 34–46.6)
HGB BLD-MCNC: 13.8 G/DL (ref 12–15.9)
HGB RETIC QN AUTO: 31.3 PG (ref 29.8–36.1)
IMM GRANULOCYTES # BLD AUTO: 0.04 10*3/MM3 (ref 0–0.05)
IMM GRANULOCYTES NFR BLD AUTO: 0.5 % (ref 0–0.5)
IMM RETICS NFR: 3.5 % (ref 3–15.8)
LYMPHOCYTES # BLD AUTO: 1.52 10*3/MM3 (ref 0.7–3.1)
LYMPHOCYTES NFR BLD AUTO: 18.6 % (ref 19.6–45.3)
MCH RBC QN AUTO: 28 PG (ref 26.6–33)
MCHC RBC AUTO-ENTMCNC: 32.2 G/DL (ref 31.5–35.7)
MCV RBC AUTO: 87 FL (ref 79–97)
MONOCYTES # BLD AUTO: 0.56 10*3/MM3 (ref 0.1–0.9)
MONOCYTES NFR BLD AUTO: 6.9 % (ref 5–12)
NEUTROPHILS # BLD AUTO: 5.93 10*3/MM3 (ref 1.7–7)
NEUTROPHILS NFR BLD AUTO: 72.5 % (ref 42.7–76)
NRBC BLD AUTO-RTO: 0 /100 WBC (ref 0–0.2)
PLATELET # BLD AUTO: 330 10*3/MM3 (ref 140–450)
PMV BLD AUTO: 9.7 FL (ref 6–12)
RBC # BLD AUTO: 4.93 10*6/MM3 (ref 3.77–5.28)
RETICS/RBC NFR AUTO: 0.81 % (ref 0.7–1.9)
WBC NRBC COR # BLD: 8.17 10*3/MM3 (ref 3.4–10.8)

## 2019-10-31 PROCEDURE — 85046 RETICYTE/HGB CONCENTRATE: CPT

## 2019-10-31 PROCEDURE — G0463 HOSPITAL OUTPT CLINIC VISIT: HCPCS | Performed by: INTERNAL MEDICINE

## 2019-10-31 PROCEDURE — 99214 OFFICE O/P EST MOD 30 MIN: CPT | Performed by: INTERNAL MEDICINE

## 2019-10-31 PROCEDURE — 85025 COMPLETE CBC W/AUTO DIFF WBC: CPT

## 2019-10-31 PROCEDURE — 36416 COLLJ CAPILLARY BLOOD SPEC: CPT

## 2019-10-31 NOTE — PROGRESS NOTES
[facility] OUTPATIENT CLINIC FOLLOW UP VISIT    REASON FOR FOLLOW-UP:    1.  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  2.  Right reduction mammoplasty for symmetry and revision of left transverse rectus abdominis myocutaneous flap with vertical lateral coning revision and revision of the medial aspect of the mastectomy scar by Dr. Narvaez on 12/29/2015.  3.  Abnormal mammogram on 8/30/2016 with a 2.4cm mass in the right breast at the 9:00 position 7cm from the nipple.  Biopsy on 9/12/2016 showed fibroadenoma.      4.  Iron deficiency anemia      HISTORY OF PRESENT ILLNESS:  Renée Johns is a 53 y.o. female who returns today for follow up of the above issues.     When I saw her last in April her hemoglobin was 12.8 with a ferritin of 11 and 8% iron saturation.  I recommended continuing her ferrous gluconate once daily at that time.    She recently had a uterine artery ablation for fibroids to help with bleeding.  She believes that this procedure was a success.  She has been off of her iron supplement for about 1 month.  She denies any new problems with her breasts.  She did recently have an abnormal screening mammogram of the right breast on 9/24/2019 with a subsequent diagnostic imaging procedure on 9/30/2019 read as BI-RADS Category 3 with 6-month follow-up suggested.    Otherwise, she stopped eating sugar and carbohydrates and has lost about 15 pounds.  She feels well.    PAST MEDICAL, SURGICAL, FAMILY, AND SOCIAL HISTORIES WERE REVIEWED WITH THE PATIENT AND IN THE ELECTRONIC MEDICAL RECORD, AND WERE UPDATED IF INDICATED.    ALLERGIES:  No Known Allergies    MEDICATIONS:  The medication list has been reviewed with the patient by the medical assistant, and the list has been updated in the electronic medical record, which I reviewed.  Medication dosages and  "frequencies were confirmed to be accurate.    REVIEW OF SYSTEMS:  PAIN:  See Vital Signs below.  GENERAL:  No fevers, chills, night sweats, or unintended weight loss.  Intentional weight loss  SKIN:  No rashes or non-healing lesions.  HEME/LYMPH:  No abnormal bleeding.  No palpable lymphadenopathy.  EYES:  No vision changes or diplopia.  ENT:  No sore throat or difficulty swallowing.  RESPIRATORY:  No cough, shortness of breath, hemoptysis, or wheezing.  CARDIOVASCULAR:  No chest pain, palpitations, orthopnea, or dyspnea on exertion.  GASTROINTESTINAL:  No abdominal pain, nausea, vomiting, constipation, diarrhea, melena, or hematochezia.   GENITOURINARY:  No dysuria or hematuria.  Irregular menstrual bleeding improved after uterine artery ablation  MUSCULOSKELETAL:  No joint pain, swelling, or erythema.  NEUROLOGIC:  No dizziness, loss of consciousness, or seizures.  PSYCHIATRIC:  No depression, anxiety, or mood changes.    Vitals:    10/31/19 0749   BP: 114/80   Pulse: 83   Resp: 16   Temp: 98.3 °F (36.8 °C)   TempSrc: Oral   SpO2: 97%   Weight: 64.5 kg (142 lb 4.8 oz)   Height: 154.4 cm (60.79\")   PainSc: 0-No pain       PHYSICAL EXAMINATION:  GENERAL:  Well-developed well-nourished female; awake, alert and oriented, in no acute distress.  SKIN:  Warm and dry, without rashes, purpura, or petechiae.  HEAD:  Normocephalic, atraumatic.  EARS:  Hearing intact.  NOSE:  Septum midline.  No excoriations or nasal discharge.  MOUTH:  No stomatitis or ulcers.  Lips are normal.  THROAT:  Oropharynx without lesions or exudates.  LYMPHATICS:  No cervical, supraclavicular, axillary lymphadenopathy  CHEST:  Lungs are clear to auscultation bilaterally.  No wheezes, rales, or rhonchi.  BREASTS: Not examined today  HEART:  Regular rate; normal rhythm.  No murmurs, gallops or rubs.  ABDOMEN:  Not examined today  EXTREMITIES:  No clubbing, cyanosis, or edema.  NEUROLOGICAL:  No focal neurologic deficits.    DIAGNOSTIC DATA:  Results " for orders placed or performed in visit on 10/31/19   Retic With IRF & RET-He   Result Value Ref Range    Immature Reticulocyte Fraction 3.5 3.0 - 15.8 %    Reticulocyte % 0.81 0.70 - 1.90 %    Reticulocyte Hgb 31.3 29.8 - 36.1 pg   CBC Auto Differential   Result Value Ref Range    WBC 8.17 3.40 - 10.80 10*3/mm3    RBC 4.93 3.77 - 5.28 10*6/mm3    Hemoglobin 13.8 12.0 - 15.9 g/dL    Hematocrit 42.9 34.0 - 46.6 %    MCV 87.0 79.0 - 97.0 fL    MCH 28.0 26.6 - 33.0 pg    MCHC 32.2 31.5 - 35.7 g/dL    RDW 13.0 12.3 - 15.4 %    RDW-SD 41.1 37.0 - 54.0 fl    MPV 9.7 6.0 - 12.0 fL    Platelets 330 140 - 450 10*3/mm3    Neutrophil % 72.5 42.7 - 76.0 %    Lymphocyte % 18.6 (L) 19.6 - 45.3 %    Monocyte % 6.9 5.0 - 12.0 %    Eosinophil % 1.0 0.3 - 6.2 %    Basophil % 0.5 0.0 - 1.5 %    Immature Grans % 0.5 0.0 - 0.5 %    Neutrophils, Absolute 5.93 1.70 - 7.00 10*3/mm3    Lymphocytes, Absolute 1.52 0.70 - 3.10 10*3/mm3    Monocytes, Absolute 0.56 0.10 - 0.90 10*3/mm3    Eosinophils, Absolute 0.08 0.00 - 0.40 10*3/mm3    Basophils, Absolute 0.04 0.00 - 0.20 10*3/mm3    Immature Grans, Absolute 0.04 0.00 - 0.05 10*3/mm3    nRBC 0.0 0.0 - 0.2 /100 WBC       IMAGING:  Bilateral screening mammogram on 9/24/2019 BI-RADS Category 0 with a cluster of microcalcifications in the middle lower third inner quadrant of the right breast.      Diagnostic right mammogram on 9/30/2019 BI-RADS Category 3 with short-term follow-up in 6 months recommended.    ASSESSMENT:  This is a 53 y.o. female with:  1.  History of hormone receptor negative DCIS of the left breast status post mastectomy with TRAM flap.  No medical therapy has been recommended.  We may consider chemoprevention when she becomes postmenopausal.    2.  Abnormal mammogram of the right breast with biopsy showing fibroadenoma    3.  Iron deficiency anemia most likely secondary to menorrhagia.  Recent uterine artery ablation.  Off of her iron supplement at this point.  Continue to  monitor.    PLAN:   1.  She can remain off of her oral iron supplement at this time.  2.  Diagnostic imaging of the right breast at about 5 months from now.  3.  I will see her back in 6 months for follow-up with labs including a CBC reticulocyte count ferritin iron profile LH FSH and estradiol level.  4.  When she becomes postmenopausal we will consider chemoprevention

## 2020-04-01 ENCOUNTER — APPOINTMENT (OUTPATIENT)
Dept: ULTRASOUND IMAGING | Facility: HOSPITAL | Age: 55
End: 2020-04-01

## 2020-04-01 ENCOUNTER — APPOINTMENT (OUTPATIENT)
Dept: MAMMOGRAPHY | Facility: HOSPITAL | Age: 55
End: 2020-04-01

## 2020-04-23 ENCOUNTER — TELEPHONE (OUTPATIENT)
Dept: ONCOLOGY | Facility: CLINIC | Age: 55
End: 2020-04-23

## 2020-04-23 NOTE — TELEPHONE ENCOUNTER
Pt , Nikolay, called to confirm appts.    Pt confirmed Mammogram on 5/6/20.    Nikolay stated he was told the appt with Dr. Alvares was going to be scheduled for after Mammogram at 11:30am on 5/6/20.    Told Nikolay appts were scheduled for 5/7/20.    Nikolay is requesting 5/7/20 appts be rescheduled for 5/6/20 after pt is completed with her tests.    Call Nikolay back at [phone].

## 2020-04-23 NOTE — TELEPHONE ENCOUNTER
Patient's  has called back and wants to keep appointments as scheduled on 5/7 now.      [phone]

## 2020-05-06 ENCOUNTER — HOSPITAL ENCOUNTER (OUTPATIENT)
Dept: MAMMOGRAPHY | Facility: HOSPITAL | Age: 55
Discharge: HOME | End: 2020-05-06
Admitting: INTERNAL MEDICINE

## 2020-05-06 ENCOUNTER — APPOINTMENT (OUTPATIENT)
Dept: ULTRASOUND IMAGING | Facility: HOSPITAL | Age: 55
End: 2020-05-06

## 2020-05-06 DIAGNOSIS — Z86.000 HISTORY OF DUCTAL CARCINOMA IN SITU (DCIS) OF BREAST: ICD-10-CM

## 2020-05-06 PROCEDURE — 77065 DX MAMMO INCL CAD UNI: CPT

## 2020-05-07 ENCOUNTER — TELEMEDICINE (OUTPATIENT)
Dept: ONCOLOGY | Facility: CLINIC | Age: 55
End: 2020-05-07

## 2020-05-07 ENCOUNTER — APPOINTMENT (OUTPATIENT)
Dept: LAB | Facility: HOSPITAL | Age: 55
End: 2020-05-07

## 2020-05-07 DIAGNOSIS — Z86.000 HISTORY OF DUCTAL CARCINOMA IN SITU (DCIS) OF BREAST: Primary | ICD-10-CM

## 2020-05-07 DIAGNOSIS — D50.0 IRON DEFICIENCY ANEMIA DUE TO CHRONIC BLOOD LOSS: ICD-10-CM

## 2020-05-07 PROCEDURE — 99213 OFFICE O/P EST LOW 20 MIN: CPT | Performed by: INTERNAL MEDICINE

## 2020-05-07 NOTE — PROGRESS NOTES
[facility] OUTPATIENT CLINIC FOLLOW UP VISIT    REASON FOR FOLLOW-UP:    1.  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  2.  Right reduction mammoplasty for symmetry and revision of left transverse rectus abdominis myocutaneous flap with vertical lateral coning revision and revision of the medial aspect of the mastectomy scar by Dr. Narvaez on 12/29/2015.  3.  Abnormal mammogram on 8/30/2016 with a 2.4cm mass in the right breast at the 9:00 position 7cm from the nipple.  Biopsy on 9/12/2016 showed fibroadenoma.      4.  Iron deficiency anemia      HISTORY OF PRESENT ILLNESS:  Renée Johns is a 54 y.o. female who returns today for follow up of the above issues.     She is doing well.  No new changes to her breast aside from some improvement in the nodularity following her breast reduction.  Minimal uterine bleeding at this point following uterine artery ablation.      She is taking precautions due to COVID-19.      PAST MEDICAL, SURGICAL, FAMILY, AND SOCIAL HISTORIES WERE REVIEWED WITH THE PATIENT AND IN THE ELECTRONIC MEDICAL RECORD, AND WERE UPDATED IF INDICATED.    ALLERGIES:  No Known Allergies    MEDICATIONS:  The medication list has been reviewed with the patient by the medical assistant, and the list has been updated in the electronic medical record, which I reviewed.  Medication dosages and frequencies were confirmed to be accurate.    REVIEW OF SYSTEMS:  PAIN:  See Vital Signs below.  GENERAL:  No fevers, chills, night sweats, or unintended weight loss.  Intentional weight loss  SKIN:  No rashes or non-healing lesions.  HEME/LYMPH:  No abnormal bleeding.  No palpable lymphadenopathy.  EYES:  No vision changes or diplopia.  ENT:  No sore throat or difficulty swallowing.  RESPIRATORY:  No cough, shortness of breath, hemoptysis, or wheezing.  CARDIOVASCULAR:   No chest pain, palpitations, orthopnea, or dyspnea on exertion.  GASTROINTESTINAL:  No abdominal pain, nausea, vomiting, constipation, diarrhea, melena, or hematochezia.   GENITOURINARY:  No dysuria or hematuria.  Irregular menstrual bleeding improved after uterine artery ablation  MUSCULOSKELETAL:  No joint pain, swelling, or erythema.  NEUROLOGIC:  No dizziness, loss of consciousness, or seizures.  PSYCHIATRIC:  No depression, anxiety, or mood changes.    There were no vitals filed for this visit.    PHYSICAL EXAMINATION:  GENERAL:  Well-developed well-nourished female; awake, alert and oriented, in no acute distress.  LUNGS:  Normal respiratory effort.    NEURO/PSYCH:  Normal mood and affect  Video visit today    DIAGNOSTIC DATA:  Results for orders placed or performed in visit on 10/31/19   Retic With IRF & RET-He   Result Value Ref Range    Immature Reticulocyte Fraction 3.5 3.0 - 15.8 %    Reticulocyte % 0.81 0.70 - 1.90 %    Reticulocyte Hgb 31.3 29.8 - 36.1 pg   CBC Auto Differential   Result Value Ref Range    WBC 8.17 3.40 - 10.80 10*3/mm3    RBC 4.93 3.77 - 5.28 10*6/mm3    Hemoglobin 13.8 12.0 - 15.9 g/dL    Hematocrit 42.9 34.0 - 46.6 %    MCV 87.0 79.0 - 97.0 fL    MCH 28.0 26.6 - 33.0 pg    MCHC 32.2 31.5 - 35.7 g/dL    RDW 13.0 12.3 - 15.4 %    RDW-SD 41.1 37.0 - 54.0 fl    MPV 9.7 6.0 - 12.0 fL    Platelets 330 140 - 450 10*3/mm3    Neutrophil % 72.5 42.7 - 76.0 %    Lymphocyte % 18.6 (L) 19.6 - 45.3 %    Monocyte % 6.9 5.0 - 12.0 %    Eosinophil % 1.0 0.3 - 6.2 %    Basophil % 0.5 0.0 - 1.5 %    Immature Grans % 0.5 0.0 - 0.5 %    Neutrophils, Absolute 5.93 1.70 - 7.00 10*3/mm3    Lymphocytes, Absolute 1.52 0.70 - 3.10 10*3/mm3    Monocytes, Absolute 0.56 0.10 - 0.90 10*3/mm3    Eosinophils, Absolute 0.08 0.00 - 0.40 10*3/mm3    Basophils, Absolute 0.04 0.00 - 0.20 10*3/mm3    Immature Grans, Absolute 0.04 0.00 - 0.05 10*3/mm3    nRBC 0.0 0.0 - 0.2 /100 WBC       IMAGING:  Bilateral screening  mammogram on 9/24/2019 BI-RADS Category 0 with a cluster of microcalcifications in the middle lower third inner quadrant of the right breast.      Diagnostic right mammogram on 9/30/2019 BI-RADS Category 3 with short-term follow-up in 6 months recommended.    Diagnostic right mammogram on 5/6/2020 BI-RADS category 2.  Routine f/u mammogram recommended.      ASSESSMENT:  This is a 54 y.o. female with:  1.  History of hormone receptor negative DCIS of the left breast status post mastectomy with TRAM flap.  No medical therapy has been recommended.  We may consider chemoprevention when she becomes postmenopausal.  Recheck labs at f/u in a few months.      2.  Abnormal mammogram of the right breast with biopsy showing fibroadenoma.  Diagnostic right mammogram on 5/6/2020 BI-RADS category 2.  Routine f/u mammogram recommended.        3.  Iron deficiency anemia most likely secondary to menorrhagia.  History of uterine artery ablation.  Off of her iron supplement at this point.  Recheck labs at f/u in a few months.      PLAN:   1.  She can remain off of her oral iron supplement at this time.  2.  Although she would be due for routine mammogram of her TRAM flap in September, I think we'll just pursue bilateral screening mammogram in one year.  3.  I will see her back in about 5-6 months for follow-up with labs including a CBC reticulocyte count ferritin iron profile LH FSH and estradiol level.  4.  When she becomes postmenopausal we will consider chemoprevention

## 2020-07-10 ENCOUNTER — TRANSCRIBE ORDERS (OUTPATIENT)
Dept: ADMINISTRATIVE | Facility: HOSPITAL | Age: 55
End: 2020-07-10

## 2020-07-10 ENCOUNTER — LAB (OUTPATIENT)
Dept: LAB | Facility: HOSPITAL | Age: 55
End: 2020-07-10

## 2020-07-10 DIAGNOSIS — Z20.828 EXPOSURE TO SARS-ASSOCIATED CORONAVIRUS: Primary | ICD-10-CM

## 2020-07-10 DIAGNOSIS — Z20.828 EXPOSURE TO SARS-ASSOCIATED CORONAVIRUS: ICD-10-CM

## 2020-07-10 PROCEDURE — 86769 SARS-COV-2 COVID-19 ANTIBODY: CPT | Performed by: INTERNAL MEDICINE

## 2020-07-10 PROCEDURE — 36415 COLL VENOUS BLD VENIPUNCTURE: CPT

## 2020-07-14 LAB — SARS-COV-2 AB SERPL QL IA: NEGATIVE

## 2020-09-14 ENCOUNTER — TELEPHONE (OUTPATIENT)
Dept: ONCOLOGY | Facility: HOSPITAL | Age: 55
End: 2020-09-14

## 2020-09-14 ENCOUNTER — TELEPHONE (OUTPATIENT)
Dept: ONCOLOGY | Facility: CLINIC | Age: 55
End: 2020-09-14

## 2020-09-14 DIAGNOSIS — Z86.000 HISTORY OF DUCTAL CARCINOMA IN SITU (DCIS) OF BREAST: Primary | ICD-10-CM

## 2020-09-14 NOTE — TELEPHONE ENCOUNTER
Message sent to Dr. Alvares to see if he wants the pt to have a left side mammogram prior to the bilateral mammogram in the spring. Pt received a letter from our office statin she needed the left side. Pt V/U.

## 2020-09-14 NOTE — TELEPHONE ENCOUNTER
Pt's  Nikolay says pt seen Dr. Alvares in May and was told that her mammogram for both breasts can wait until Spring of next year. Pt received a letter advising her to schedule her mammogram.    Please give pt's  a call back to clarify: [phone]

## 2020-09-14 NOTE — TELEPHONE ENCOUNTER
----- Message from Lalo Alvares MD sent at 9/14/2020 12:24 PM EDT -----  Regarding: RE: Mammogram  From then until now she got a couple of diagnostic right mammograms.  This is why they are saying she only needs a left.  What ever they recommend is fine.  I suspect she just needs a left as they say.  [facility]  ----- Message -----  From: Mónica Dickerson RN  Sent: 9/14/2020  10:43 AM EDT  To: Lalo Alvares MD  Subject: Mammogram                                        Pt received a letter from our office that she needs a left side only mammogram. Your last note states Bilateral in one year. Do you want a left side only?

## 2020-10-12 ENCOUNTER — HOSPITAL ENCOUNTER (OUTPATIENT)
Dept: MAMMOGRAPHY | Facility: HOSPITAL | Age: 55
Discharge: HOME | End: 2020-10-12
Admitting: INTERNAL MEDICINE

## 2020-10-12 DIAGNOSIS — Z86.000 HISTORY OF DUCTAL CARCINOMA IN SITU (DCIS) OF BREAST: ICD-10-CM

## 2020-10-12 PROCEDURE — 77067 SCR MAMMO BI INCL CAD: CPT

## 2020-10-12 PROCEDURE — 77063 BREAST TOMOSYNTHESIS BI: CPT

## 2020-10-22 ENCOUNTER — OFFICE VISIT (OUTPATIENT)
Dept: ONCOLOGY | Facility: CLINIC | Age: 55
End: 2020-10-22

## 2020-10-22 ENCOUNTER — LAB (OUTPATIENT)
Dept: LAB | Facility: HOSPITAL | Age: 55
End: 2020-10-22

## 2020-10-22 VITALS
WEIGHT: 151.8 LBS | SYSTOLIC BLOOD PRESSURE: 137 MMHG | HEART RATE: 88 BPM | DIASTOLIC BLOOD PRESSURE: 83 MMHG | BODY MASS INDEX: 28.66 KG/M2 | TEMPERATURE: 97.1 F | RESPIRATION RATE: 16 BRPM | OXYGEN SATURATION: 97 % | HEIGHT: 61 IN

## 2020-10-22 DIAGNOSIS — Z86.000 HISTORY OF DUCTAL CARCINOMA IN SITU (DCIS) OF BREAST: Primary | ICD-10-CM

## 2020-10-22 DIAGNOSIS — Z86.000 HISTORY OF DUCTAL CARCINOMA IN SITU (DCIS) OF BREAST: ICD-10-CM

## 2020-10-22 DIAGNOSIS — D50.0 IRON DEFICIENCY ANEMIA DUE TO CHRONIC BLOOD LOSS: ICD-10-CM

## 2020-10-22 LAB
BASOPHILS # BLD AUTO: 0.04 10*3/MM3 (ref 0–0.2)
BASOPHILS NFR BLD AUTO: 0.6 % (ref 0–1.5)
DEPRECATED RDW RBC AUTO: 39.2 FL (ref 37–54)
EOSINOPHIL # BLD AUTO: 0.03 10*3/MM3 (ref 0–0.4)
EOSINOPHIL NFR BLD AUTO: 0.4 % (ref 0.3–6.2)
ERYTHROCYTE [DISTWIDTH] IN BLOOD BY AUTOMATED COUNT: 12.5 % (ref 12.3–15.4)
ESTRADIOL SERPL HS-MCNC: 202 PG/ML
FERRITIN SERPL-MCNC: 13.6 NG/ML (ref 11–207)
FSH SERPL-ACNC: 26.8 MIU/ML
HCT VFR BLD AUTO: 40.3 % (ref 34–46.6)
HGB BLD-MCNC: 12.9 G/DL (ref 12–15.9)
HGB RETIC QN AUTO: 32.1 PG (ref 29.8–36.1)
IMM GRANULOCYTES # BLD AUTO: 0.02 10*3/MM3 (ref 0–0.05)
IMM GRANULOCYTES NFR BLD AUTO: 0.3 % (ref 0–0.5)
IMM RETICS NFR: 7.6 % (ref 3–15.8)
IRON 24H UR-MRATE: 54 MCG/DL (ref 37–145)
IRON SATN MFR SERPL: 15 % (ref 14–48)
LH SERPL-ACNC: 45.1 MIU/ML
LYMPHOCYTES # BLD AUTO: 1.79 10*3/MM3 (ref 0.7–3.1)
LYMPHOCYTES NFR BLD AUTO: 25.4 % (ref 19.6–45.3)
MCH RBC QN AUTO: 27.6 PG (ref 26.6–33)
MCHC RBC AUTO-ENTMCNC: 32 G/DL (ref 31.5–35.7)
MCV RBC AUTO: 86.3 FL (ref 79–97)
MONOCYTES # BLD AUTO: 0.52 10*3/MM3 (ref 0.1–0.9)
MONOCYTES NFR BLD AUTO: 7.4 % (ref 5–12)
NEUTROPHILS NFR BLD AUTO: 4.64 10*3/MM3 (ref 1.7–7)
NEUTROPHILS NFR BLD AUTO: 65.9 % (ref 42.7–76)
NRBC BLD AUTO-RTO: 0 /100 WBC (ref 0–0.2)
PLATELET # BLD AUTO: 244 10*3/MM3 (ref 140–450)
PMV BLD AUTO: 8.6 FL (ref 6–12)
RBC # BLD AUTO: 4.67 10*6/MM3 (ref 3.77–5.28)
RETICS # AUTO: 0.05 10*6/MM3 (ref 0.02–0.13)
RETICS/RBC NFR AUTO: 1.05 % (ref 0.7–1.9)
TIBC SERPL-MCNC: 356 MCG/DL (ref 249–505)
TRANSFERRIN SERPL-MCNC: 254 MG/DL (ref 200–360)
WBC # BLD AUTO: 7.04 10*3/MM3 (ref 3.4–10.8)

## 2020-10-22 PROCEDURE — 99214 OFFICE O/P EST MOD 30 MIN: CPT | Performed by: INTERNAL MEDICINE

## 2020-10-22 PROCEDURE — 82728 ASSAY OF FERRITIN: CPT

## 2020-10-22 PROCEDURE — 82670 ASSAY OF TOTAL ESTRADIOL: CPT | Performed by: INTERNAL MEDICINE

## 2020-10-22 PROCEDURE — 83001 ASSAY OF GONADOTROPIN (FSH): CPT | Performed by: INTERNAL MEDICINE

## 2020-10-22 PROCEDURE — 83002 ASSAY OF GONADOTROPIN (LH): CPT | Performed by: INTERNAL MEDICINE

## 2020-10-22 PROCEDURE — 85046 RETICYTE/HGB CONCENTRATE: CPT

## 2020-10-22 PROCEDURE — 83540 ASSAY OF IRON: CPT

## 2020-10-22 PROCEDURE — 36415 COLL VENOUS BLD VENIPUNCTURE: CPT

## 2020-10-22 PROCEDURE — 85025 COMPLETE CBC W/AUTO DIFF WBC: CPT

## 2020-10-22 PROCEDURE — 84466 ASSAY OF TRANSFERRIN: CPT

## 2020-10-22 NOTE — PROGRESS NOTES
[facility] OUTPATIENT CLINIC FOLLOW UP VISIT    REASON FOR FOLLOW-UP:    1.  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  2.  Right reduction mammoplasty for symmetry and revision of left transverse rectus abdominis myocutaneous flap with vertical lateral coning revision and revision of the medial aspect of the mastectomy scar by Dr. Narvaez on 12/29/2015.  3.  Abnormal mammogram on 8/30/2016 with a 2.4cm mass in the right breast at the 9:00 position 7cm from the nipple.  Biopsy on 9/12/2016 showed fibroadenoma.      4.  Iron deficiency anemia      HISTORY OF PRESENT ILLNESS:  Renée Johns is a 54 y.o. female who returns today for follow up of the above issues.     She is doing well.  She is a little bit anxious due to the pandemic.  She has irregular menstrual periods, last about 2 months ago.  No other bleeding.  No new changes with respect to her breasts.    PAST MEDICAL, SURGICAL, FAMILY, AND SOCIAL HISTORIES WERE REVIEWED WITH THE PATIENT AND IN THE ELECTRONIC MEDICAL RECORD, AND WERE UPDATED IF INDICATED.    ALLERGIES:  No Known Allergies    MEDICATIONS:  The medication list has been reviewed with the patient by the medical assistant, and the list has been updated in the electronic medical record, which I reviewed.  Medication dosages and frequencies were confirmed to be accurate.    REVIEW OF SYSTEMS:  PAIN:  See Vital Signs below.  GENERAL:  No fevers, chills, night sweats, or unintended weight loss.  SKIN:  No rashes or non-healing lesions.  HEME/LYMPH:  No abnormal bleeding.  No palpable lymphadenopathy.  EYES:  No vision changes or diplopia.  ENT:  No sore throat or difficulty swallowing.  RESPIRATORY:  No cough, shortness of breath, hemoptysis, or wheezing.  CARDIOVASCULAR:  No chest pain, palpitations, orthopnea, or dyspnea on  "exertion.  GASTROINTESTINAL:  No abdominal pain, nausea, vomiting, constipation, diarrhea, melena, or hematochezia.   GENITOURINARY:  No dysuria or hematuria.  Irregular menstrual bleeding improved after uterine artery ablation  MUSCULOSKELETAL:  No joint pain, swelling, or erythema.  NEUROLOGIC:  No dizziness, loss of consciousness, or seizures.  PSYCHIATRIC: Some anxiety  Reviewed 10/22/2020    Vitals:    10/22/20 0952   BP: 137/83   Pulse: 88   Resp: 16   Temp: 97.1 °F (36.2 °C)   TempSrc: Temporal   SpO2: 97%   Weight: 68.9 kg (151 lb 12.8 oz)   Height: 154.4 cm (60.79\")   PainSc: 0-No pain  Comment: anemia       PHYSICAL EXAMINATION:  General:  No acute distress, awake, alert and oriented  Skin:  Warm and dry, no visible rash  HEENT:  Normocephalic/atraumatic.  Wearing a facemask.  Chest:  Normal respiratory effort.  Lungs clear to auscultation bilaterally.  Heart: Regular rate and rhythm without murmurs gallops or rubs  Extremities:  No visible clubbing, cyanosis, or edema  Neuro/psych:  Grossly non-focal.  Normal mood and affect.  Breasts not examined today      DIAGNOSTIC DATA:  Results for orders placed or performed in visit on 10/22/20   Ferritin    Specimen: Blood   Result Value Ref Range    Ferritin 13.60 11.00 - 207.00 ng/mL   Iron Profile    Specimen: Blood   Result Value Ref Range    Iron 54 37 - 145 mcg/dL    Iron Saturation 15 14 - 48 %    Transferrin 254 200 - 360 mg/dL    TIBC 356 249 - 505 mcg/dL   Retic With IRF & RET-He    Specimen: Blood   Result Value Ref Range    Immature Reticulocyte Fraction 7.6 3.0 - 15.8 %    Reticulocyte % 1.05 0.70 - 1.90 %    Reticulocyte Absolute 0.0490 0.0200 - 0.1300 10*6/mm3    Reticulocyte Hgb 32.1 29.8 - 36.1 pg   CBC Auto Differential    Specimen: Blood   Result Value Ref Range    WBC 7.04 3.40 - 10.80 10*3/mm3    RBC 4.67 3.77 - 5.28 10*6/mm3    Hemoglobin 12.9 12.0 - 15.9 g/dL    Hematocrit 40.3 34.0 - 46.6 %    MCV 86.3 79.0 - 97.0 fL    MCH 27.6 26.6 - 33.0 " pg    MCHC 32.0 31.5 - 35.7 g/dL    RDW 12.5 12.3 - 15.4 %    RDW-SD 39.2 37.0 - 54.0 fl    MPV 8.6 6.0 - 12.0 fL    Platelets 244 140 - 450 10*3/mm3    Neutrophil % 65.9 42.7 - 76.0 %    Lymphocyte % 25.4 19.6 - 45.3 %    Monocyte % 7.4 5.0 - 12.0 %    Eosinophil % 0.4 0.3 - 6.2 %    Basophil % 0.6 0.0 - 1.5 %    Immature Grans % 0.3 0.0 - 0.5 %    Neutrophils, Absolute 4.64 1.70 - 7.00 10*3/mm3    Lymphocytes, Absolute 1.79 0.70 - 3.10 10*3/mm3    Monocytes, Absolute 0.52 0.10 - 0.90 10*3/mm3    Eosinophils, Absolute 0.03 0.00 - 0.40 10*3/mm3    Basophils, Absolute 0.04 0.00 - 0.20 10*3/mm3    Immature Grans, Absolute 0.02 0.00 - 0.05 10*3/mm3    nRBC 0.0 0.0 - 0.2 /100 WBC       IMAGING:  Bilateral screening mammogram on 9/24/2019 BI-RADS Category 0 with a cluster of microcalcifications in the middle lower third inner quadrant of the right breast.      Diagnostic right mammogram on 9/30/2019 BI-RADS Category 3 with short-term follow-up in 6 months recommended.    Diagnostic right mammogram on 5/6/2020 BI-RADS category 2.  Routine f/u mammogram recommended.      Bilateral screening mammogram 10/12/2020 BI-RADS category 2. Negative in the right breast or L TRAM flap.    Images personally reviewed    ASSESSMENT: Stand corrected  This is a 54 y.o. female with:  1.  History of hormone receptor negative DCIS of the left breast status post mastectomy with TRAM flap.  No medical therapy has been recommended.  We may consider chemoprevention when she becomes postmenopausal.  Recheck labs pending from today.      2.  Abnormal mammogram of the right breast with biopsy showing fibroadenoma.  Diagnostic right mammogram on 5/6/2020 BI-RADS category 2.  Routine f/u mammogram recommended.  She had a follow-up screening mammogram on 10/12/2020 read as BI-RADS Category 2.  Follow-up in 1 year recommended.       3.  Iron deficiency anemia most likely secondary to menorrhagia.  History of uterine artery ablation.  Off of her iron  supplement at this point.  Iron studies today show a normal iron of 54 with 15% iron saturation but her ferritin is low at 13.6.  This is actually improved from 1 year ago when her iron was 31 and her ferritin was 11.  She can remain off of her oral iron supplement.    PLAN:   1.  She may remain off her iron supplement at this time as her numbers actually look a little better today.  2.  She will be due for routine mammogram in October 2021.  3.  Follow-up pending hormone levels from today including an LH, FSH, estradiol level although I do not believe that she will be postmenopausal as she continues to have some menstrual bleeding intermittently.  4.  When she does become postmenopausal we will consider chemoprevention  5.  I will see her back in 6 months for follow-up with a CBC and breast exam

## 2021-03-30 ENCOUNTER — BULK ORDERING (OUTPATIENT)
Dept: CASE MANAGEMENT | Facility: OTHER | Age: 56
End: 2021-03-30

## 2021-03-30 DIAGNOSIS — Z23 IMMUNIZATION DUE: ICD-10-CM

## 2021-04-08 ENCOUNTER — APPOINTMENT (OUTPATIENT)
Dept: LAB | Facility: HOSPITAL | Age: 56
End: 2021-04-08

## 2021-04-08 ENCOUNTER — TELEPHONE (OUTPATIENT)
Dept: ONCOLOGY | Facility: CLINIC | Age: 56
End: 2021-04-08

## 2021-04-08 NOTE — TELEPHONE ENCOUNTER
Spoke with pt and spouse, they are in transition moving to [place], [place] and wanted to schedule one last appt with Dr Alvares as it will be a little bit before establishing an appt in [place] with another doctor

## 2021-04-08 NOTE — TELEPHONE ENCOUNTER
Caller: MJ    Relationship to patient: SPOUSE     Best call back number: [phone]    Chief complaint: PT SPOUSE CALLING TO R/S 4/8/21 APPT FOR 4/16/21 IN THE AFTERNOON. SPOUSE WOULD LIKE FOR PT TO BE SEEN ONCE LAST TIME BEFORE THE MOVE SINCE THEY KNOW IT WILL BE AWHILE UNTIL SHE WILL BE ABLE TO BE SEEN IN [place]    Type of visit: LAB, F/U    Requested date: 4/16/21 AFTERNOON    If rescheduling, when is the original appointment: 4/8/21    Additional notes:      HUB COULD NOT SCHEDULE WITHIN TIME

## 2021-04-22 NOTE — PROGRESS NOTES
[facility] OUTPATIENT CLINIC FOLLOW UP VISIT    REASON FOR FOLLOW-UP:    1.  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  2.  Right reduction mammoplasty for symmetry and revision of left transverse rectus abdominis myocutaneous flap with vertical lateral coning revision and revision of the medial aspect of the mastectomy scar by Dr. Narvaez on 12/29/2015.  3.  Abnormal mammogram on 8/30/2016 with a 2.4cm mass in the right breast at the 9:00 position 7cm from the nipple.  Biopsy on 9/12/2016 showed fibroadenoma.      4.  Iron deficiency anemia      HISTORY OF PRESENT ILLNESS:  Renée Johns is a 55 y.o. female who returns today for follow up of the above issues.     Doing well. LMP in December 2020. No new breast problems or changes.    REVIEW OF SYSTEMS:  PAIN:  See Vital Signs below.  GENERAL:  No fevers, chills, night sweats, or unintended weight loss.  SKIN:  No rashes or non-healing lesions.  HEME/LYMPH:  No abnormal bleeding.  No palpable lymphadenopathy.  EYES:  No vision changes or diplopia.  ENT:  No sore throat or difficulty swallowing.  RESPIRATORY:  No cough, shortness of breath, hemoptysis, or wheezing.  CARDIOVASCULAR:  No chest pain, palpitations, orthopnea, or dyspnea on exertion.  GASTROINTESTINAL:  No abdominal pain, nausea, vomiting, constipation, diarrhea, melena, or hematochezia.   GENITOURINARY:  No dysuria or hematuria.  Irregular menstrual bleeding improved after uterine artery ablation  MUSCULOSKELETAL:  No joint pain, swelling, or erythema.  NEUROLOGIC:  No dizziness, loss of consciousness, or seizures.  PSYCHIATRIC: Some anxiety and stress as she is moving to [place].    Vitals:    04/23/21 1525   BP: 137/87   Pulse: 85   Resp: 16   Temp: 97.5 °F (36.4 °C)   TempSrc: Temporal   SpO2: 96%   Weight: 72.2 kg (159 lb 1.6 oz)   Height: 154.4 cm  "(60.79\")   PainSc: 0-No pain  Comment: anemia       PHYSICAL EXAMINATION:  General:  No acute distress, awake, alert and oriented  Skin:  Warm and dry, no visible rash  HEENT:  Normocephalic/atraumatic.  Wearing a facemask.  Chest:  Normal respiratory effort.  Lungs clear to auscultation bilaterally.  Heart: Regular rate and rhythm without murmurs gallops or rubs  Extremities:  No visible clubbing, cyanosis, or edema  Neuro/psych:  Grossly non-focal.  Normal mood and affect.  Breasts: both breasts examined today. Post-surgical changes, chronic and stable. No nipple discharge.       DIAGNOSTIC DATA:  Retic With IRF & RET-He (04/23/2021 15:31)  CBC & Differential (04/23/2021 15:31)        IMAGING:  Bilateral screening mammogram on 9/24/2019 BI-RADS Category 0 with a cluster of microcalcifications in the middle lower third inner quadrant of the right breast.      Diagnostic right mammogram on 9/30/2019 BI-RADS Category 3 with short-term follow-up in 6 months recommended.    Diagnostic right mammogram on 5/6/2020 BI-RADS category 2.  Routine f/u mammogram recommended.      Bilateral screening mammogram 10/12/2020 BI-RADS category 2. Negative in the right breast or L TRAM flap.        ASSESSMENT:   This is a 55 y.o. female with:    *History of hormone receptor negative DCIS of the left breast status post mastectomy with TRAM flap.   · No medical therapy has been recommended.    · May consider chemoprevention when she becomes postmenopausal.     · Labs 10/22/2020 indicated pre-menopausal state  · LMP December 2020    *Abnormal mammogram of the right breast with biopsy showing fibroadenoma.    · Diagnostic right mammogram on 5/6/2020 BI-RADS category 2.    · Routine f/u mammogram recommended.    · She had a follow-up screening mammogram on 10/12/2020 read as BI-RADS Category 2.    · Follow-up in 1 year recommended.       *History of iron deficiency anemia most likely secondary to menorrhagia.    · History of uterine artery " ablation.    · No regular menstrual bleeding  · Hgb normal. No iron needed.    PLAN:   1. She is moving to [place], [place]. I will see if I can locate an oncologist there for her to see.  2. Follow up here as needed.

## 2021-04-23 ENCOUNTER — LAB (OUTPATIENT)
Dept: OTHER | Facility: HOSPITAL | Age: 56
End: 2021-04-23

## 2021-04-23 ENCOUNTER — OFFICE VISIT (OUTPATIENT)
Dept: ONCOLOGY | Facility: CLINIC | Age: 56
End: 2021-04-23

## 2021-04-23 VITALS
HEIGHT: 61 IN | WEIGHT: 159.1 LBS | TEMPERATURE: 97.5 F | OXYGEN SATURATION: 96 % | BODY MASS INDEX: 30.04 KG/M2 | DIASTOLIC BLOOD PRESSURE: 87 MMHG | SYSTOLIC BLOOD PRESSURE: 137 MMHG | RESPIRATION RATE: 16 BRPM | HEART RATE: 85 BPM

## 2021-04-23 DIAGNOSIS — D50.0 IRON DEFICIENCY ANEMIA DUE TO CHRONIC BLOOD LOSS: ICD-10-CM

## 2021-04-23 DIAGNOSIS — D50.0 IRON DEFICIENCY ANEMIA DUE TO CHRONIC BLOOD LOSS: Primary | ICD-10-CM

## 2021-04-23 DIAGNOSIS — Z86.000 HISTORY OF DUCTAL CARCINOMA IN SITU (DCIS) OF BREAST: Primary | ICD-10-CM

## 2021-04-23 LAB
BASOPHILS # BLD AUTO: 0.03 10*3/MM3 (ref 0–0.2)
BASOPHILS NFR BLD AUTO: 0.3 % (ref 0–1.5)
DEPRECATED RDW RBC AUTO: 39.4 FL (ref 37–54)
EOSINOPHIL # BLD AUTO: 0.03 10*3/MM3 (ref 0–0.4)
EOSINOPHIL NFR BLD AUTO: 0.3 % (ref 0.3–6.2)
ERYTHROCYTE [DISTWIDTH] IN BLOOD BY AUTOMATED COUNT: 13.1 % (ref 12.3–15.4)
HCT VFR BLD AUTO: 42.9 % (ref 34–46.6)
HGB BLD-MCNC: 13.9 G/DL (ref 12–15.9)
HGB RETIC QN AUTO: 31.4 PG (ref 29.8–36.1)
IMM GRANULOCYTES # BLD AUTO: 0.04 10*3/MM3 (ref 0–0.05)
IMM GRANULOCYTES NFR BLD AUTO: 0.4 % (ref 0–0.5)
IMM RETICS NFR: 3.7 % (ref 3–15.8)
LYMPHOCYTES # BLD AUTO: 1.81 10*3/MM3 (ref 0.7–3.1)
LYMPHOCYTES NFR BLD AUTO: 19.2 % (ref 19.6–45.3)
MCH RBC QN AUTO: 26.7 PG (ref 26.6–33)
MCHC RBC AUTO-ENTMCNC: 32.4 G/DL (ref 31.5–35.7)
MCV RBC AUTO: 82.3 FL (ref 79–97)
MONOCYTES # BLD AUTO: 0.49 10*3/MM3 (ref 0.1–0.9)
MONOCYTES NFR BLD AUTO: 5.2 % (ref 5–12)
NEUTROPHILS NFR BLD AUTO: 7.01 10*3/MM3 (ref 1.7–7)
NEUTROPHILS NFR BLD AUTO: 74.6 % (ref 42.7–76)
NRBC BLD AUTO-RTO: 0 /100 WBC (ref 0–0.2)
PLATELET # BLD AUTO: 276 10*3/MM3 (ref 140–450)
PMV BLD AUTO: 10.4 FL (ref 6–12)
RBC # BLD AUTO: 5.21 10*6/MM3 (ref 3.77–5.28)
RETICS # AUTO: 0.05 10*6/MM3 (ref 0.02–0.13)
RETICS/RBC NFR AUTO: 0.94 % (ref 0.7–1.9)
WBC # BLD AUTO: 9.41 10*3/MM3 (ref 3.4–10.8)

## 2021-04-23 PROCEDURE — 99213 OFFICE O/P EST LOW 20 MIN: CPT | Performed by: INTERNAL MEDICINE

## 2021-04-23 PROCEDURE — 36415 COLL VENOUS BLD VENIPUNCTURE: CPT

## 2021-04-23 PROCEDURE — 85025 COMPLETE CBC W/AUTO DIFF WBC: CPT | Performed by: INTERNAL MEDICINE

## 2021-04-23 PROCEDURE — 85046 RETICYTE/HGB CONCENTRATE: CPT | Performed by: INTERNAL MEDICINE

## 2021-09-03 ENCOUNTER — TELEPHONE (OUTPATIENT)
Dept: ONCOLOGY | Facility: CLINIC | Age: 56
End: 2021-09-03

## 2021-09-03 NOTE — TELEPHONE ENCOUNTER
Caller: Nikolay Bai    Relationship: Emergency Contact    Best call back number: [phone]    What form or medical record are you requesting: MEDICAL RECORDS (SCANS/PATH/OP NOTES, LABS, OV NOTES)     Who is requesting this form or medical record from you: DR. SAULO ARIAS/ONCOLOGIST IN [place] AT [facility].     If fax, what is the fax number: [phone]    ITimeframe paperwork needed: ASAP SO APPT CAN BE MADE FOR TREATMENT TO CONTINUE.  ATTN: PANDA     Additional notes: PLEASE CONTACT SPOUSE FOR ANY FURTHER QUESTIONS.